# Patient Record
Sex: MALE | Race: WHITE | NOT HISPANIC OR LATINO | URBAN - METROPOLITAN AREA
[De-identification: names, ages, dates, MRNs, and addresses within clinical notes are randomized per-mention and may not be internally consistent; named-entity substitution may affect disease eponyms.]

---

## 2018-03-05 ENCOUNTER — INPATIENT (INPATIENT)
Facility: HOSPITAL | Age: 78
LOS: 2 days | Discharge: HOME | End: 2018-03-08
Attending: INTERNAL MEDICINE

## 2018-03-05 VITALS
TEMPERATURE: 97 F | HEART RATE: 83 BPM | DIASTOLIC BLOOD PRESSURE: 59 MMHG | SYSTOLIC BLOOD PRESSURE: 122 MMHG | RESPIRATION RATE: 18 BRPM | HEIGHT: 63 IN | WEIGHT: 147.93 LBS | OXYGEN SATURATION: 98 %

## 2018-03-05 DIAGNOSIS — J18.9 PNEUMONIA, UNSPECIFIED ORGANISM: ICD-10-CM

## 2018-03-05 DIAGNOSIS — C34.90 MALIGNANT NEOPLASM OF UNSPECIFIED PART OF UNSPECIFIED BRONCHUS OR LUNG: ICD-10-CM

## 2018-03-05 DIAGNOSIS — J15.6 PNEUMONIA DUE TO OTHER GRAM-NEGATIVE BACTERIA: ICD-10-CM

## 2018-03-05 DIAGNOSIS — I25.10 ATHEROSCLEROTIC HEART DISEASE OF NATIVE CORONARY ARTERY WITHOUT ANGINA PECTORIS: ICD-10-CM

## 2018-03-05 DIAGNOSIS — J98.19 OTHER PULMONARY COLLAPSE: Chronic | ICD-10-CM

## 2018-03-05 DIAGNOSIS — N18.6 END STAGE RENAL DISEASE: ICD-10-CM

## 2018-03-05 LAB
ALBUMIN SERPL ELPH-MCNC: 3.1 G/DL — SIGNIFICANT CHANGE UP (ref 3–5.5)
ALP SERPL-CCNC: 108 U/L — SIGNIFICANT CHANGE UP (ref 30–115)
ALT FLD-CCNC: 10 U/L — SIGNIFICANT CHANGE UP (ref 0–41)
ANION GAP SERPL CALC-SCNC: 10 MMOL/L — SIGNIFICANT CHANGE UP (ref 7–14)
APTT BLD: 30 SEC — SIGNIFICANT CHANGE UP (ref 27–39.2)
AST SERPL-CCNC: 13 U/L — SIGNIFICANT CHANGE UP (ref 0–41)
BASOPHILS # BLD AUTO: 0.03 K/UL — SIGNIFICANT CHANGE UP (ref 0–0.2)
BASOPHILS NFR BLD AUTO: 0.5 % — SIGNIFICANT CHANGE UP (ref 0–1)
BILIRUB SERPL-MCNC: 0.8 MG/DL — SIGNIFICANT CHANGE UP (ref 0.2–1.2)
BUN SERPL-MCNC: 54 MG/DL — HIGH (ref 10–20)
CALCIUM SERPL-MCNC: 9.2 MG/DL — SIGNIFICANT CHANGE UP (ref 8.5–10.1)
CHLORIDE SERPL-SCNC: 100 MMOL/L — SIGNIFICANT CHANGE UP (ref 98–110)
CK MB BLD-MCNC: 12 % — HIGH (ref 0–4)
CK MB CFR SERPL CALC: 3.1 NG/ML — SIGNIFICANT CHANGE UP (ref 0.6–6.3)
CK SERPL-CCNC: 26 U/L — SIGNIFICANT CHANGE UP (ref 0–225)
CO2 SERPL-SCNC: 31 MMOL/L — SIGNIFICANT CHANGE UP (ref 17–32)
CREAT SERPL-MCNC: 6.2 MG/DL — CRITICAL HIGH (ref 0.7–1.5)
EOSINOPHIL # BLD AUTO: 0.13 K/UL — SIGNIFICANT CHANGE UP (ref 0–0.7)
EOSINOPHIL NFR BLD AUTO: 2.1 % — SIGNIFICANT CHANGE UP (ref 0–8)
GLUCOSE SERPL-MCNC: 128 MG/DL — HIGH (ref 70–110)
HCT VFR BLD CALC: 36.5 % — LOW (ref 42–52)
HGB BLD-MCNC: 11.6 G/DL — LOW (ref 14–18)
IMM GRANULOCYTES NFR BLD AUTO: 0.5 % — HIGH (ref 0.1–0.3)
INR BLD: 1.21 RATIO — SIGNIFICANT CHANGE UP (ref 0.65–1.3)
LYMPHOCYTES # BLD AUTO: 0.84 K/UL — LOW (ref 1.2–3.4)
LYMPHOCYTES # BLD AUTO: 13.3 % — LOW (ref 20.5–51.1)
MCHC RBC-ENTMCNC: 31.6 PG — HIGH (ref 27–31)
MCHC RBC-ENTMCNC: 31.8 G/DL — LOW (ref 32–37)
MCV RBC AUTO: 99.5 FL — HIGH (ref 80–94)
MONOCYTES # BLD AUTO: 0.89 K/UL — HIGH (ref 0.1–0.6)
MONOCYTES NFR BLD AUTO: 14.1 % — HIGH (ref 1.7–9.3)
NEUTROPHILS # BLD AUTO: 4.4 K/UL — SIGNIFICANT CHANGE UP (ref 1.4–6.5)
NEUTROPHILS NFR BLD AUTO: 69.5 % — SIGNIFICANT CHANGE UP (ref 42.2–75.2)
NRBC # BLD: 0 /100 WBCS — SIGNIFICANT CHANGE UP (ref 0–0)
PLATELET # BLD AUTO: 165 K/UL — SIGNIFICANT CHANGE UP (ref 130–400)
POTASSIUM SERPL-MCNC: 4 MMOL/L — SIGNIFICANT CHANGE UP (ref 3.5–5)
POTASSIUM SERPL-SCNC: 4 MMOL/L — SIGNIFICANT CHANGE UP (ref 3.5–5)
PROT SERPL-MCNC: 6.2 G/DL — SIGNIFICANT CHANGE UP (ref 6–8)
PROTHROM AB SERPL-ACNC: 13.2 SEC — HIGH (ref 9.95–12.87)
RBC # BLD: 3.67 M/UL — LOW (ref 4.7–6.1)
RBC # FLD: 15.2 % — HIGH (ref 11.5–14.5)
SODIUM SERPL-SCNC: 141 MMOL/L — SIGNIFICANT CHANGE UP (ref 135–146)
TROPONIN I SERPL-MCNC: 0.03 NG/ML — SIGNIFICANT CHANGE UP (ref 0–0.05)
WBC # BLD: 6.32 K/UL — SIGNIFICANT CHANGE UP (ref 4.8–10.8)
WBC # FLD AUTO: 6.32 K/UL — SIGNIFICANT CHANGE UP (ref 4.8–10.8)

## 2018-03-05 RX ORDER — IPRATROPIUM/ALBUTEROL SULFATE 18-103MCG
3 AEROSOL WITH ADAPTER (GRAM) INHALATION ONCE
Qty: 0 | Refills: 0 | Status: COMPLETED | OUTPATIENT
Start: 2018-03-05 | End: 2018-03-05

## 2018-03-05 RX ORDER — CEFEPIME 1 G/1
1000 INJECTION, POWDER, FOR SOLUTION INTRAMUSCULAR; INTRAVENOUS EVERY 12 HOURS
Qty: 0 | Refills: 0 | Status: DISCONTINUED | OUTPATIENT
Start: 2018-03-05 | End: 2018-03-06

## 2018-03-05 RX ORDER — ASPIRIN/CALCIUM CARB/MAGNESIUM 324 MG
81 TABLET ORAL DAILY
Qty: 0 | Refills: 0 | Status: DISCONTINUED | OUTPATIENT
Start: 2018-03-05 | End: 2018-03-08

## 2018-03-05 RX ORDER — CLOPIDOGREL BISULFATE 75 MG/1
75 TABLET, FILM COATED ORAL DAILY
Qty: 0 | Refills: 0 | Status: DISCONTINUED | OUTPATIENT
Start: 2018-03-05 | End: 2018-03-08

## 2018-03-05 RX ORDER — CEFTRIAXONE 500 MG/1
1 INJECTION, POWDER, FOR SOLUTION INTRAMUSCULAR; INTRAVENOUS EVERY 24 HOURS
Qty: 0 | Refills: 0 | Status: DISCONTINUED | OUTPATIENT
Start: 2018-03-05 | End: 2018-03-06

## 2018-03-05 RX ORDER — AZITHROMYCIN 500 MG/1
500 TABLET, FILM COATED ORAL EVERY 24 HOURS
Qty: 0 | Refills: 0 | Status: DISCONTINUED | OUTPATIENT
Start: 2018-03-05 | End: 2018-03-08

## 2018-03-05 RX ORDER — ALBUTEROL 90 UG/1
2.5 AEROSOL, METERED ORAL
Qty: 0 | Refills: 0 | Status: DISCONTINUED | OUTPATIENT
Start: 2018-03-05 | End: 2018-03-08

## 2018-03-05 RX ORDER — HEPARIN SODIUM 5000 [USP'U]/ML
5000 INJECTION INTRAVENOUS; SUBCUTANEOUS EVERY 8 HOURS
Qty: 0 | Refills: 0 | Status: DISCONTINUED | OUTPATIENT
Start: 2018-03-05 | End: 2018-03-08

## 2018-03-05 RX ORDER — SODIUM CHLORIDE 9 MG/ML
3 INJECTION INTRAMUSCULAR; INTRAVENOUS; SUBCUTANEOUS ONCE
Qty: 0 | Refills: 0 | Status: COMPLETED | OUTPATIENT
Start: 2018-03-05 | End: 2018-03-05

## 2018-03-05 RX ADMIN — Medication 3 MILLILITER(S): at 16:40

## 2018-03-05 RX ADMIN — Medication 125 MILLIGRAM(S): at 16:40

## 2018-03-05 RX ADMIN — CEFEPIME 100 MILLIGRAM(S): 1 INJECTION, POWDER, FOR SOLUTION INTRAMUSCULAR; INTRAVENOUS at 21:14

## 2018-03-05 RX ADMIN — SODIUM CHLORIDE 3 MILLILITER(S): 9 INJECTION INTRAMUSCULAR; INTRAVENOUS; SUBCUTANEOUS at 16:40

## 2018-03-05 RX ADMIN — Medication 3 MILLILITER(S): at 16:23

## 2018-03-05 RX ADMIN — Medication 3 MILLILITER(S): at 18:08

## 2018-03-05 NOTE — H&P ADULT - PMH
COPD (chronic obstructive pulmonary disease)    Coronary artery disease    End stage renal disease    Lung cancer

## 2018-03-05 NOTE — ED PROVIDER NOTE - PROGRESS NOTE DETAILS
supervising care of this patient pt reports relief of symptoms after neb treatment. discussed results of CXR with patients and discussed benefits of admission. pt agrees with plan for IV ABx.

## 2018-03-05 NOTE — ED PROVIDER NOTE - ATTENDING CONTRIBUTION TO CARE
78 yo m with pmh of ESRD on HD, copd , lung CA, presents with 10 days of cough, congestion.  pt already finished 5 days of prednisone and levaquin.  no fevers, no chills, no nausea, no vomiting, no back pain, no headache.  no other complaints.    awake, alert.  neck supple.  Lungs with mild wheezing on the left.  No distress.  + prolonged expiration.  p:  nebs, steroids, cxr.  possible copd exacerbation.

## 2018-03-05 NOTE — H&P ADULT - NSHPPHYSICALEXAM_GEN_ALL_CORE
Lying in no acute distress  Pupils equal and reactive to light and accommodation  Supple Neck  right lower lobe decreased breath sounds, with rales   + Murmur with regualr rate and rhythm  + bowel sounds nontnender  no cyanosis or edema  awake alert and oriented x 3

## 2018-03-05 NOTE — H&P ADULT - NSHPLABSRESULTS_GEN_ALL_CORE
11.6   6.32  )-----------( 165      ( 05 Mar 2018 14:28 )             36.5   03-05    141  |  100  |  54<H>  ----------------------------<  128<H>  4.0   |  31  |  6.2<HH>    Ca    9.2      05 Mar 2018 14:28    TPro  6.2  /  Alb  3.1  /  TBili  0.8  /  DBili  x   /  AST  13  /  ALT  10  /  AlkPhos  108  03-05      < from: Xray Chest 2 Views PA/Lat (03.05.18 @ 15:57) >    Impression:      Right upper lobe opacity, right apical pleural thickening, right opacity   and fibrosis , right costophrenic angle blunting, mediastinal shift to   the right, new... Stable cardiomegaly        < end of copied text >    EKG SInus 80 bpm interpreted by me

## 2018-03-05 NOTE — ED ADULT TRIAGE NOTE - CHIEF COMPLAINT QUOTE
Pt states "I've been having this wheeze in my chest for 2 weeks.  My doctor put me on Levaquin and Prednisone but I still have the wheeze".

## 2018-03-05 NOTE — ED PROVIDER NOTE - OBJECTIVE STATEMENT
78y/o M, h/o lung CA-in remission, COPD, ESRD-on dialysis (Sun, Tues, Thurs- received dialysis yesterday) presents with wheezing and dry cough for 2 weeks. Pt reports he was prescribed Levaquin and prednisone, which provided minimal relief of his wheezing. minimal relief with daily inhalers. denies fever, CP, palpitations, VALERO, leg swelling, orthopnea.

## 2018-03-05 NOTE — H&P ADULT - HISTORY OF PRESENT ILLNESS
77y male presented with complaints of shortness of breath and wheezing for 2 weeks. No releiving or aggravating factors, no associated symtptoms ,had trial of levaquin x 10 days which did not alleviate the symptoms

## 2018-03-05 NOTE — H&P ADULT - ASSESSMENT
77y with significant pulmonary history presented with shrotness of breath   diagnosis suspected gram neg pneumonia

## 2018-03-06 DIAGNOSIS — I20.8 OTHER FORMS OF ANGINA PECTORIS: ICD-10-CM

## 2018-03-06 RX ORDER — CEFEPIME 1 G/1
1000 INJECTION, POWDER, FOR SOLUTION INTRAMUSCULAR; INTRAVENOUS EVERY 24 HOURS
Qty: 0 | Refills: 0 | Status: DISCONTINUED | OUTPATIENT
Start: 2018-03-06 | End: 2018-03-06

## 2018-03-06 RX ORDER — CEFEPIME 1 G/1
1000 INJECTION, POWDER, FOR SOLUTION INTRAMUSCULAR; INTRAVENOUS DAILY
Qty: 0 | Refills: 0 | Status: DISCONTINUED | OUTPATIENT
Start: 2018-03-06 | End: 2018-03-08

## 2018-03-06 RX ORDER — FUROSEMIDE 40 MG
40 TABLET ORAL ONCE
Qty: 0 | Refills: 0 | Status: COMPLETED | OUTPATIENT
Start: 2018-03-06 | End: 2018-03-06

## 2018-03-06 RX ORDER — CEFEPIME 1 G/1
500 INJECTION, POWDER, FOR SOLUTION INTRAMUSCULAR; INTRAVENOUS ONCE
Qty: 0 | Refills: 0 | Status: COMPLETED | OUTPATIENT
Start: 2018-03-06 | End: 2018-03-06

## 2018-03-06 RX ORDER — CEFEPIME 1 G/1
INJECTION, POWDER, FOR SOLUTION INTRAMUSCULAR; INTRAVENOUS
Qty: 0 | Refills: 0 | Status: DISCONTINUED | OUTPATIENT
Start: 2018-03-06 | End: 2018-03-06

## 2018-03-06 RX ADMIN — AZITHROMYCIN 255 MILLIGRAM(S): 500 TABLET, FILM COATED ORAL at 05:29

## 2018-03-06 RX ADMIN — CEFEPIME 100 MILLIGRAM(S): 1 INJECTION, POWDER, FOR SOLUTION INTRAMUSCULAR; INTRAVENOUS at 15:43

## 2018-03-06 RX ADMIN — CLOPIDOGREL BISULFATE 75 MILLIGRAM(S): 75 TABLET, FILM COATED ORAL at 11:09

## 2018-03-06 RX ADMIN — Medication 50 MILLIGRAM(S): at 05:27

## 2018-03-06 RX ADMIN — Medication 81 MILLIGRAM(S): at 11:09

## 2018-03-06 RX ADMIN — CEFTRIAXONE 100 GRAM(S): 500 INJECTION, POWDER, FOR SOLUTION INTRAMUSCULAR; INTRAVENOUS at 05:27

## 2018-03-06 RX ADMIN — Medication 40 MILLIGRAM(S): at 13:13

## 2018-03-06 RX ADMIN — CEFEPIME 100 MILLIGRAM(S): 1 INJECTION, POWDER, FOR SOLUTION INTRAMUSCULAR; INTRAVENOUS at 05:29

## 2018-03-06 NOTE — CONSULT NOTE ADULT - SUBJECTIVE AND OBJECTIVE BOX
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 He is maintained on hemodialysis on TTS at [facility] in [place], [place]. His HD access is a LUE AVF. Last HD treatment was Saturday. Presented with 1 week history of cough, SOB, and VALERO. CXR showed pneumonia. Started on antibiotics.    PAST HISTORY  --------------------------------------------------------------------------------  PAST MEDICAL & SURGICAL HISTORY:  End stage renal disease  Coronary artery disease  Lung cancer  COPD (chronic obstructive pulmonary disease)  Lung collapse    FAMILY HISTORY: negative for kidney disease    PAST SOCIAL HISTORY: Former smoker. No current ETOH, tobacco and illicit drug use.    ALLERGIES & MEDICATIONS  --------------------------------------------------------------------------------  Allergies    No Known Allergies    Standing Inpatient Medications  aspirin  chewable 81 milliGRAM(s) Oral daily  azithromycin  IVPB 500 milliGRAM(s) IV Intermittent every 24 hours  cefepime  IVPB 1000 milliGRAM(s) IV Intermittent every 12 hours  cefTRIAXone   IVPB 1 Gram(s) IV Intermittent every 24 hours  clopidogrel Tablet 75 milliGRAM(s) Oral daily  heparin  Injectable 5000 Unit(s) SubCutaneous every 8 hours  predniSONE   Tablet 50 milliGRAM(s) Oral daily    PRN Inpatient Medications  ALBUTerol   0.5% 2.5 milliGRAM(s) Nebulizer four times a day PRN      REVIEW OF SYSTEMS  --------------------------------------------------------------------------------  Gen: No weakness  Skin: No rashes  Head/Eyes/Ears/Mouth: No headache; No sinus pain/discomfort, sore throat  Respiratory: No dyspnea, cough, wheezing, hemoptysis  CV: No chest pain, PND, orthopnea  GI: No abdominal pain, diarrhea, constipation, nausea, vomiting, melena, hematochezia  : No increased frequency, dysuria, hematuria, nocturia    All other systems were reviewed and are negative, except as noted.    VITALS/PHYSICAL EXAM  --------------------------------------------------------------------------------  T(C): 35.7 (03-06-18 @ 05:28), Max: 36 (03-05-18 @ 13:22)  HR: 95 (03-06-18 @ 05:28) (73 - 95)  BP: 140/79 (03-06-18 @ 05:28) (122/59 - 151/67)  RR: 16 (03-06-18 @ 05:28) (16 - 18)  SpO2: 98% (03-06-18 @ 07:43) (97% - 98%)  Height (cm): 160.02 (03-05-18 @ 19:32)  Weight (kg): 67.1 (03-05-18 @ 13:22)  BMI (kg/m2): 26.2 (03-05-18 @ 19:32)  BSA (m2): 1.7 (03-05-18 @ 19:32)    Physical Exam:  	Gen: NAD, well-appearing  	HEENT: PERRL, supple neck, clear oropharynx  	Pulm: R wheeze/ronchi  	CV: RRR, S1S2  	Back: No CVA tenderness; no sacral edema  	Abd: +BS, soft, nontender/nondistended  	: No suprapubic tenderness  	LE: Warm, trace edema  	Neuro: AAO x3  	Psych: Normal affect and mood  	Skin: Warm, without rashes  	Vascular access: LUE AVF with good thrill/bruit    LABS/STUDIES  --------------------------------------------------------------------------------              11.6   6.32  >-----------<  165      [03-05-18 @ 14:28]              36.5     141  |  100  |  54  ----------------------------<  128      [03-05-18 @ 14:28]  4.0   |  31  |  6.2        Ca     9.2     [03-05-18 @ 14:28]    TPro  6.2  /  Alb  3.1  /  TBili  0.8  /  DBili  x   /  AST  13  /  ALT  10  /  AlkPhos  108  [03-05-18 @ 14:28]    PT/INR: PT 13.20, INR 1.21       [03-05-18 @ 14:28]  PTT: 30.0       [03-05-18 @ 14:28]    Troponin 0.03      [03-05-18 @ 14:28]  CK 26      [03-05-18 @ 14:28]

## 2018-03-06 NOTE — CONSULT NOTE ADULT - SUBJECTIVE AND OBJECTIVE BOX
77y male presented with complaints of shortness of breath and wheezing for 2 weeks. No releiving or aggravating factors, no associated symtptoms ,had trial of levaquin x 10 days which did not alleviate the symptoms (05 Mar 2018 18:52)    PAST MEDICAL & SURGICAL HISTORY:  End stage renal disease  Coronary artery disease  Lung cancer  COPD (chronic obstructive pulmonary disease)  Lung collapse    SOCIAL HISTORY:  Smoking: ex smoker    Allergies  No Known Allergies    Home Medications:  aspirin 81 mg oral tablet, chewable: 1 tab(s) orally once a day (05 Mar 2018 18:55)  Plavix 75 mg oral tablet: 1 tab(s) orally once a day (05 Mar 2018 18:55)  pravastatin 40 mg oral tablet: 1 tab(s) orally once a day (05 Mar 2018 18:55)      REVIEW OF SYSTEMS:  Constitutional: No fevers or chills. No weight loss. No fatigue or generalized malaise.  Eyes: No itching or discharge from the eyes  ENT: No ear pain. No ear discharge. No nasal congestion. No post nasal drip. No epistaxis. No throat pain. No sore throat. No difficulty swallowing.   CV: +chest pain. No palpitations. No lightheadedness or dizziness.   Resp: +dyspnea at rest. +dyspnea on exertion. No orthopnea. +wheezing. +cough. No stridor. No sputum production. No chest pain with respiration.  GI: No nausea. No vomiting. No diarrhea.  MSK: No joint pain or pain in any extremities  Integumentary: No skin lesions. No pedal edema.  Neurological: No gross motor weakness. No sensory changes.    OBJECTIVE:  ICU Vital Signs Last 24 Hrs  T(C): 35.7 (06 Mar 2018 05:28), Max: 36 (05 Mar 2018 13:22)  T(F): 96.3 (06 Mar 2018 05:28), Max: 96.8 (05 Mar 2018 13:22)  HR: 95 (06 Mar 2018 05:28) (73 - 95)  BP: 140/79 (06 Mar 2018 05:28) (122/59 - 151/67)  BP(mean): --  ABP: --  ABP(mean): --  RR: 16 (06 Mar 2018 05:28) (16 - 18)  SpO2: 98% (06 Mar 2018 07:43) (97% - 98%)      PHYSICAL EXAM:  · CONSTITUTIONAL: - - -  · Appearance: ILL APPEARING  · Development: well developed  · Distress: mild  · Manner: appropriate for situation  · Mentation: awake, alert, oriented to person, place, time/situation  · Mood: appropriate  · Nourishment: well  · ENMT: Airway patent, Nasal mucosa clear. Mouth with normal mucosa. Throat has no vesicles, no oropharyngeal exudates and uvula is midline.  · EYES: Clear bilaterally, pupils equal, round and reactive to light.  · CARDIAC: - - -  · Cardiac Rhythm: REGULAR  · Cardiac Rate: TACHYCARDIC  · Heart Sounds: S1-S2  · Murmur: no murmur  · Pedal Edema: PITTING  · Capillary Refill: less than or equal to 2 seconds  · Jugular Vein Distention: non-distended bilaterally  · Cardiac Pulses: normal bilaterally  · RESPIRATORY: - - -  · Respiratory Distress: no  · Breath Sounds: wheeze  · GASTROINTESTINAL: Abdomen soft, non-tender, no guarding.  · MUSCULOSKELETAL: Spine appears normal, range of motion is not limited, no muscle or joint tenderness  · NEUROLOGICAL: Alert and oriented, no focal deficits, no motor or sensory deficits.  · SKIN: Skin normal color for race, warm, dry and intact. No evidence of rash.      HOSPITAL MEDICATIONS:  MEDICATIONS  (STANDING):  aspirin  chewable 81 milliGRAM(s) Oral daily  azithromycin  IVPB 500 milliGRAM(s) IV Intermittent every 24 hours  cefepime  IVPB 1000 milliGRAM(s) IV Intermittent every 12 hours  cefTRIAXone   IVPB 1 Gram(s) IV Intermittent every 24 hours  clopidogrel Tablet 75 milliGRAM(s) Oral daily  heparin  Injectable 5000 Unit(s) SubCutaneous every 8 hours  levoFLOXacin IVPB 750 milliGRAM(s) IV Intermittent every 24 hours  predniSONE   Tablet 50 milliGRAM(s) Oral daily    MEDICATIONS  (PRN):  ALBUTerol   0.5% 2.5 milliGRAM(s) Nebulizer four times a day PRN Shortness of Breath      LABS:                        11.6   6.32  )-----------( 165      ( 05 Mar 2018 14:28 )             36.5     03-05    141  |  100  |  54<H>  ----------------------------<  128<H>  4.0   |  31  |  6.2<HH>    Ca    9.2      05 Mar 2018 14:28    TPro  6.2  /  Alb  3.1  /  TBili  0.8  /  DBili  x   /  AST  13  /  ALT  10  /  AlkPhos  108  03-05    PT/INR - ( 05 Mar 2018 14:28 )   PT: 13.20 sec;   INR: 1.21 ratio      PTT - ( 05 Mar 2018 14:28 )  PTT:30.0 sec      RADIOLOGY:  Right upper lobe opacity, right apical pleural thickening, right opacity   and fibrosis , right costophrenic angle blunting, mediastinal shift to   the right, new... Stable cardiomegaly      ASSESMENT:  Acute chronic COPD with exacerbation  Lung Cancer  atelectasis RUL and RLL possibly due to mediastinal adenopathy    PLAN:    Check O2 sat on NC, record, continue O2 as necessary to maintain sats > 90%  Continue IV solumedrol as ordered with bronchodilator treatments ATC and PRN  will need CT chest non contrast  OOB to chair  GI and DVT prophylaxis  pulmonary toilet  Monitor clinically, convert to oral prednisone as clinical improvement allows 77y male presented with complaints of shortness of breath and wheezing for 2 weeks. No releiving or aggravating factors, no associated symtptoms ,had trial of levaquin x 10 days which did not alleviate the symptoms (05 Mar 2018 18:52)    PAST MEDICAL & SURGICAL HISTORY:  End stage renal disease  Coronary artery disease  Lung cancer  COPD (chronic obstructive pulmonary disease)  Lung collapse    SOCIAL HISTORY:  Smoking: ex smoker    Allergies  No Known Allergies    Home Medications:  aspirin 81 mg oral tablet, chewable: 1 tab(s) orally once a day (05 Mar 2018 18:55)  Plavix 75 mg oral tablet: 1 tab(s) orally once a day (05 Mar 2018 18:55)  pravastatin 40 mg oral tablet: 1 tab(s) orally once a day (05 Mar 2018 18:55)      REVIEW OF SYSTEMS:  Constitutional: No fevers or chills. No weight loss. No fatigue or generalized malaise.  Eyes: No itching or discharge from the eyes  ENT: No ear pain. No ear discharge. No nasal congestion. No post nasal drip. No epistaxis. No throat pain. No sore throat. No difficulty swallowing.   CV: +chest pain. No palpitations. No lightheadedness or dizziness.   Resp: +dyspnea at rest. +dyspnea on exertion. No orthopnea. +wheezing. +cough. No stridor. No sputum production. No chest pain with respiration.  GI: No nausea. No vomiting. No diarrhea.  MSK: No joint pain or pain in any extremities  Integumentary: No skin lesions. No pedal edema.  Neurological: No gross motor weakness. No sensory changes.    OBJECTIVE:  ICU Vital Signs Last 24 Hrs  T(C): 35.7 (06 Mar 2018 05:28), Max: 36 (05 Mar 2018 13:22)  T(F): 96.3 (06 Mar 2018 05:28), Max: 96.8 (05 Mar 2018 13:22)  HR: 95 (06 Mar 2018 05:28) (73 - 95)  BP: 140/79 (06 Mar 2018 05:28) (122/59 - 151/67)  BP(mean): --  ABP: --  ABP(mean): --  RR: 16 (06 Mar 2018 05:28) (16 - 18)  SpO2: 98% (06 Mar 2018 07:43) (97% - 98%)      PHYSICAL EXAM:  · CONSTITUTIONAL: - - -  · Appearance: NOT  ILL APPEARING  · Development: well developed  · Distress: no  · Manner: appropriate for situation  · Mentation: awake, alert, oriented to person, place, time/situation  · Mood: appropriate  · Nourishment: well  · ENMT: Airway patent, Nasal mucosa clear. Mouth with normal mucosa. Throat has no vesicles, no oropharyngeal exudates and uvula is midline.  · EYES: Clear bilaterally, pupils equal, round and reactive to light.  · CARDIAC: - - -  · Cardiac Rhythm: REGULAR  · Cardiac Rate: normal  · Heart Sounds: S1-S2  · Murmur: no murmur  · Pedal Edema: PITTING  · Capillary Refill: less than or equal to 2 seconds  · Jugular Vein Distention: non-distended bilaterally  · Cardiac Pulses: normal bilaterally  · RESPIRATORY: - - -  · Respiratory Distress: no  · Breath Sounds: wheeze on R only  · GASTROINTESTINAL: Abdomen soft, non-tender, no guarding.  · MUSCULOSKELETAL: Spine appears normal, range of motion is not limited, no muscle or joint tenderness  · NEUROLOGICAL: Alert and oriented, no focal deficits, no motor or sensory deficits.  · SKIN: Skin normal color for race, warm, dry and intact. No evidence of rash.      HOSPITAL MEDICATIONS:  MEDICATIONS  (STANDING):  aspirin  chewable 81 milliGRAM(s) Oral daily  azithromycin  IVPB 500 milliGRAM(s) IV Intermittent every 24 hours  cefepime  IVPB 1000 milliGRAM(s) IV Intermittent every 12 hours  cefTRIAXone   IVPB 1 Gram(s) IV Intermittent every 24 hours  clopidogrel Tablet 75 milliGRAM(s) Oral daily  heparin  Injectable 5000 Unit(s) SubCutaneous every 8 hours  levoFLOXacin IVPB 750 milliGRAM(s) IV Intermittent every 24 hours  predniSONE   Tablet 50 milliGRAM(s) Oral daily    MEDICATIONS  (PRN):  ALBUTerol   0.5% 2.5 milliGRAM(s) Nebulizer four times a day PRN Shortness of Breath      LABS:                        11.6   6.32  )-----------( 165      ( 05 Mar 2018 14:28 )             36.5     03-05    141  |  100  |  54<H>  ----------------------------<  128<H>  4.0   |  31  |  6.2<HH>    Ca    9.2      05 Mar 2018 14:28    TPro  6.2  /  Alb  3.1  /  TBili  0.8  /  DBili  x   /  AST  13  /  ALT  10  /  AlkPhos  108  03-05    PT/INR - ( 05 Mar 2018 14:28 )   PT: 13.20 sec;   INR: 1.21 ratio      PTT - ( 05 Mar 2018 14:28 )  PTT:30.0 sec      RADIOLOGY:  Right upper lobe opacity, right apical pleural thickening, right opacity   and fibrosis , right costophrenic angle blunting, mediastinal shift to   the right, new... Stable cardiomegaly      ASSESMENT:  Acute chronic COPD with exacerbation  Lung Cancer  -new atelectasis RUL and RLL possibly due to mediastinal adenopathy from return of the cancer?. I do not think he has a pneumonia.    PLAN:    -Check O2 sat on NC, record, continue O2 as necessary to maintain sats > 90%  -taper IV solumedrol as ordered with bronchodilator treatments ATC and PRN  -will need CT chest non contrast. The patient has appointment with his oncologist in [place] next week. He says he is scheduled for a CT at that time.  -ambulate  -GI and DVT prophylaxis  -pulmonary toilet  -Monitor clinically, convert to oral prednisone as clinical improvement allows  The patient can be discharged from a pulmonary standpoint. He is currently waiting for a run of RRT prior to D/C however

## 2018-03-06 NOTE — DISCHARGE NOTE ADULT - CARE PLAN
Principal Discharge DX:	Pneumonia  Goal:	improved from presentation  Assessment and plan of treatment:	po antibioitics and prednsione taper  Secondary Diagnosis:	End stage renal disease  Goal:	on hd  Assessment and plan of treatment:	hd tts as regualrly scheduled Principal Discharge DX:	Pneumonia  Goal:	improved from presentation  Assessment and plan of treatment:	po antibioitics and prednsione taper  Secondary Diagnosis:	End stage renal disease  Goal:	on hd  Assessment and plan of treatment:	hd tts as regularly scheduled

## 2018-03-06 NOTE — PROGRESS NOTE ADULT - PROBLEM SELECTOR PLAN 1
rocephin azithromycin, nebulizers, prednisone, blood culture,     appreciate pulmonary consult  will order ct scan

## 2018-03-06 NOTE — DISCHARGE NOTE ADULT - HOSPITAL COURSE
77y male presented with complaints of shortness of breath and wheezing for 2 weeks. No releiving or aggravating factors, no associated symtptoms ,had trial of levaquin x 10 days which did not alleviate the symptoms	  < from: Xray Chest 2 Views PA/Lat (03.05.18 @ 15:57) >    Right upper lobe opacity, right apical pleural thickening, right opacity   and fibrosis , right costophrenic angle blunting, mediastinal shift to   the right, new... Stable cardiomegaly    < end of copied text >

## 2018-03-06 NOTE — CONSULT NOTE ADULT - ASSESSMENT
1. ESRD on HD TTS. Will arrange for HD tomorrow, no urgent indication for HD today. HD prescription: 3 hours, opti 160 dialyzer, 2K bath, 1L UF.  2. Pneumonia. Stop ceftriaxone. Decrease cefepime to 1gm IV daily. Continue azithromycin.  3. Anemia. Adequate Hgb. No need for SHELLIE.

## 2018-03-06 NOTE — PROGRESS NOTE ADULT - SUBJECTIVE AND OBJECTIVE BOX
JASMINA CEBALLOS  77y  [facility]-S [address] [place] [address]      Patient is a 77y old  Male who presents with a chief complaint of Shortness of breath (05 Mar 2018 18:52)      INTERVAL HPI/OVERNIGHT EVENTS:  no events overnight , walking in no distress      REVIEW OF SYSTEMS:  CONSTITUTIONAL: No fever, weight loss, or fatigue  EYES: No eye pain, visual disturbances, or discharge  ENMT:  No difficulty hearing, tinnitus, vertigo; No sinus or throat pain  NECK: No pain or stiffness  BREASTS: No pain, masses, or nipple discharge  RESPIRATORY: No cough, wheezing, chills or hemoptysis; No shortness of breath  CARDIOVASCULAR: No chest pain, palpitations, dizziness, or leg swelling  GASTROINTESTINAL: No abdominal or epigastric pain. No nausea, vomiting, or hematemesis; No diarrhea or constipation. No melena or hematochezia.  GENITOURINARY: No dysuria, frequency, hematuria, or incontinence  NEUROLOGICAL: No headaches, memory loss, loss of strength, numbness, or tremors  SKIN: No itching, burning, rashes, or lesions   LYMPH NODES: No enlarged glands  ENDOCRINE: No heat or cold intolerance; No hair loss  MUSCULOSKELETAL: No joint pain or swelling; No muscle, back, or extremity pain  PSYCHIATRIC: No depression, anxiety, mood swings, or difficulty sleeping  HEME/LYMPH: No easy bruising, or bleeding gums  ALLERY AND IMMUNOLOGIC: No hives or eczema  FAMILY HISTORY:    T(C): 35.7 (03-06-18 @ 05:28), Max: 36 (03-05-18 @ 13:22)  HR: 95 (03-06-18 @ 05:28) (73 - 95)  BP: 140/79 (03-06-18 @ 05:28) (122/59 - 151/67)  RR: 16 (03-06-18 @ 05:28) (16 - 18)  SpO2: 98% (03-06-18 @ 07:43) (97% - 98%)  Wt(kg): --Vital Signs Last 24 Hrs  T(C): 35.7 (06 Mar 2018 05:28), Max: 36 (05 Mar 2018 13:22)  T(F): 96.3 (06 Mar 2018 05:28), Max: 96.8 (05 Mar 2018 13:22)  HR: 95 (06 Mar 2018 05:28) (73 - 95)  BP: 140/79 (06 Mar 2018 05:28) (122/59 - 151/67)  BP(mean): --  RR: 16 (06 Mar 2018 05:28) (16 - 18)  SpO2: 98% (06 Mar 2018 07:43) (97% - 98%)    PHYSICAL EXAM:  GENERAL: NAD, well-groomed, well-developed  HEAD:  Atraumatic, Normocephalic  EYES: EOMI, PERRLA, conjunctiva and sclera clear  ENMT: No tonsillar erythema, exudates, or enlargement; Moist mucous membranes, Good dentition, No lesions  NECK: Supple, No JVD, Normal thyroid  NERVOUS SYSTEM:  Alert & Oriented X3, Good concentration; Motor Strength 5/5 B/L upper and lower extremities; DTRs 2+ intact and symmetric  CHEST/LUNG: Clear to auscultation bilaterally  HEART: Regular rate and rhythm; No murmurs, rubs, or gallops  ABDOMEN: Soft, Nontender, Nondistended; Bowel sounds present  EXTREMITIES:  2+ Peripheral Pulses, No clubbing, cyanosis, or edema  LYMPH: No lymphadenopathy noted  SKIN: No rashes or lesions    Consultant(s) Notes Reviewed:  [x ] YES  [ ] NO  Care Discussed with Consultants/Other Providers [ x] YES  [ ] NO    LABS:                            11.6   6.32  )-----------( 165      ( 05 Mar 2018 14:28 )             36.5   03-05    141  |  100  |  54<H>  ----------------------------<  128<H>  4.0   |  31  |  6.2<HH>    Ca    9.2      05 Mar 2018 14:28    TPro  6.2  /  Alb  3.1  /  TBili  0.8  /  DBili  x   /  AST  13  /  ALT  10  /  AlkPhos  108  03-05            ALBUTerol   0.5% 2.5 milliGRAM(s) Nebulizer four times a day PRN  aspirin  chewable 81 milliGRAM(s) Oral daily  azithromycin  IVPB 500 milliGRAM(s) IV Intermittent every 24 hours  cefepime  IVPB 1000 milliGRAM(s) IV Intermittent every 12 hours  cefTRIAXone   IVPB 1 Gram(s) IV Intermittent every 24 hours  clopidogrel Tablet 75 milliGRAM(s) Oral daily  heparin  Injectable 5000 Unit(s) SubCutaneous every 8 hours  predniSONE   Tablet 50 milliGRAM(s) Oral daily      HEALTH ISSUES - PROBLEM Dx:  End stage renal disease: End stage renal disease  Coronary artery disease: Coronary artery disease  Lung cancer: Lung cancer  Pneumonia: Pneumonia          Case Discussed with House Staff   45 minutes spent on total encounter; more than 50% of the visit was spent counseling and/or coordinating care by the attending physician. JASMINA CEBALLOS  77y  [facility]-S [address] [place] [address] 1      Patient is a 77y old  Male who presents with a chief complaint of Shortness of breath (05 Mar 2018 18:52)      INTERVAL HPI/OVERNIGHT EVENTS:  no events overnight , walking in no distress      REVIEW OF SYSTEMS:  CONSTITUTIONAL: No fever, weight loss, or fatigue  EYES: No eye pain, visual disturbances, or discharge  ENMT:  No difficulty hearing, tinnitus, vertigo; No sinus or throat pain  NECK: No pain or stiffness  BREASTS: No pain, masses, or nipple discharge  RESPIRATORY: No cough, wheezing, chills or hemoptysis; No shortness of breath  CARDIOVASCULAR: + Chest Pain midsternal  GASTROINTESTINAL: No abdominal or epigastric pain. No nausea, vomiting, or hematemesis; No diarrhea or constipation. No melena or hematochezia.  GENITOURINARY: No dysuria, frequency, hematuria, or incontinence  NEUROLOGICAL: No headaches, memory loss, loss of strength, numbness, or tremors  SKIN: No itching, burning, rashes, or lesions   LYMPH NODES: No enlarged glands  ENDOCRINE: No heat or cold intolerance; No hair loss  MUSCULOSKELETAL: No joint pain or swelling; No muscle, back, or extremity pain  PSYCHIATRIC: No depression, anxiety, mood swings, or difficulty sleeping  HEME/LYMPH: No easy bruising, or bleeding gums  ALLERY AND IMMUNOLOGIC: No hives or eczema  FAMILY HISTORY:    T(C): 35.7 (03-06-18 @ 05:28), Max: 36 (03-05-18 @ 13:22)  HR: 95 (03-06-18 @ 05:28) (73 - 95)  BP: 140/79 (03-06-18 @ 05:28) (122/59 - 151/67)  RR: 16 (03-06-18 @ 05:28) (16 - 18)  SpO2: 98% (03-06-18 @ 07:43) (97% - 98%)  Wt(kg): --Vital Signs Last 24 Hrs  T(C): 35.7 (06 Mar 2018 05:28), Max: 36 (05 Mar 2018 13:22)  T(F): 96.3 (06 Mar 2018 05:28), Max: 96.8 (05 Mar 2018 13:22)  HR: 95 (06 Mar 2018 05:28) (73 - 95)  BP: 140/79 (06 Mar 2018 05:28) (122/59 - 151/67)  BP(mean): --  RR: 16 (06 Mar 2018 05:28) (16 - 18)  SpO2: 98% (06 Mar 2018 07:43) (97% - 98%)    PHYSICAL EXAM:  GENERAL: NAD, well-groomed, well-developed  HEAD:  Atraumatic, Normocephalic  EYES: EOMI, PERRLA, conjunctiva and sclera clear  ENMT: No tonsillar erythema, exudates, or enlargement; Moist mucous membranes, Good dentition, No lesions  NECK: Supple, No JVD, Normal thyroid  NERVOUS SYSTEM:  Alert & Oriented X3, Good concentration; Motor Strength 5/5 B/L upper and lower extremities; DTRs 2+ intact and symmetric  CHEST/LUNG: Scant wheezes  HEART: Regular rate and rhythm; No murmurs, rubs, or gallops  ABDOMEN: Soft, Nontender, Nondistended; Bowel sounds present  EXTREMITIES:  2+ Peripheral Pulses, No clubbing, cyanosis, or edema  LYMPH: No lymphadenopathy noted  SKIN: No rashes or lesions    Consultant(s) Notes Reviewed:  [x ] YES  [ ] NO  Care Discussed with Consultants/Other Providers [ x] YES  [ ] NO    LABS:                            11.6   6.32  )-----------( 165      ( 05 Mar 2018 14:28 )             36.5   03-05    141  |  100  |  54<H>  ----------------------------<  128<H>  4.0   |  31  |  6.2<HH>    Ca    9.2      05 Mar 2018 14:28    TPro  6.2  /  Alb  3.1  /  TBili  0.8  /  DBili  x   /  AST  13  /  ALT  10  /  AlkPhos  108  03-05            ALBUTerol   0.5% 2.5 milliGRAM(s) Nebulizer four times a day PRN  aspirin  chewable 81 milliGRAM(s) Oral daily  azithromycin  IVPB 500 milliGRAM(s) IV Intermittent every 24 hours  cefepime  IVPB 1000 milliGRAM(s) IV Intermittent every 12 hours  cefTRIAXone   IVPB 1 Gram(s) IV Intermittent every 24 hours  clopidogrel Tablet 75 milliGRAM(s) Oral daily  heparin  Injectable 5000 Unit(s) SubCutaneous every 8 hours  predniSONE   Tablet 50 milliGRAM(s) Oral daily      HEALTH ISSUES - PROBLEM Dx:  End stage renal disease: End stage renal disease  Coronary artery disease: Coronary artery disease  Lung cancer: Lung cancer  Pneumonia: Pneumonia          Case Discussed with House Staff   45 minutes spent on total encounter; more than 50% of the visit was spent counseling and/or coordinating care by the attending physician.

## 2018-03-06 NOTE — DISCHARGE NOTE ADULT - PATIENT PORTAL LINK FT
You can access the [facility], offered by [facility], by registering with the following website: [email or web address]

## 2018-03-06 NOTE — DISCHARGE NOTE ADULT - PLAN OF CARE
improved from presentation po antibioitics and prednsione taper on hd hd tts as regualrly scheduled hd tts as regularly scheduled

## 2018-03-06 NOTE — DISCHARGE NOTE ADULT - MEDICATION SUMMARY - MEDICATIONS TO TAKE
I will START or STAY ON the medications listed below when I get home from the hospital:    Deltasone 20 mg oral tablet  -- 2 tab(s) by mouth once a day   take 40 mg/day for 30 days, then 20 mg/day for 3 days  -- It is very important that you take or use this exactly as directed.  Do not skip doses or discontinue unless directed by your doctor.  Obtain medical advice before taking any non-prescription drugs as some may affect the action of this medication.  Take with food or milk.    -- Indication: For COPD (chronic obstructive pulmonary disease)    aspirin 81 mg oral tablet, chewable  -- 1 tab(s) by mouth once a day  -- Indication: For Coronary artery disease    pravastatin 40 mg oral tablet  -- 1 tab(s) by mouth once a day  -- Indication: For Coronary artery disease    Plavix 75 mg oral tablet  -- 1 tab(s) by mouth once a day  -- Indication: For Coronary artery disease

## 2018-03-07 RX ORDER — ATORVASTATIN CALCIUM 80 MG/1
10 TABLET, FILM COATED ORAL AT BEDTIME
Qty: 0 | Refills: 0 | Status: DISCONTINUED | OUTPATIENT
Start: 2018-03-07 | End: 2018-03-08

## 2018-03-07 RX ADMIN — Medication 50 MILLIGRAM(S): at 05:35

## 2018-03-07 RX ADMIN — CLOPIDOGREL BISULFATE 75 MILLIGRAM(S): 75 TABLET, FILM COATED ORAL at 14:13

## 2018-03-07 RX ADMIN — ATORVASTATIN CALCIUM 10 MILLIGRAM(S): 80 TABLET, FILM COATED ORAL at 21:12

## 2018-03-07 RX ADMIN — AZITHROMYCIN 255 MILLIGRAM(S): 500 TABLET, FILM COATED ORAL at 05:35

## 2018-03-07 RX ADMIN — Medication 81 MILLIGRAM(S): at 14:13

## 2018-03-07 RX ADMIN — CEFEPIME 1000 MILLIGRAM(S): 1 INJECTION, POWDER, FOR SOLUTION INTRAMUSCULAR; INTRAVENOUS at 13:57

## 2018-03-07 NOTE — PROGRESS NOTE ADULT - PROBLEM SELECTOR PLAN 5
refusing acs workup,  discussed risks and benefits including death  pt currently comfortable  refusing to go home today because of the snow
refusing acs workup,  discussed risks and benefits including death,

## 2018-03-07 NOTE — PROGRESS NOTE ADULT - SUBJECTIVE AND OBJECTIVE BOX
JASMINA CEBALLOS  77y  Male      Patient is a 77y old  Male who presents with a chief complaint of Shortness of breath (06 Mar 2018 17:49)      INTERVAL HPI/OVERNIGHT EVENTS:  none  had HD today, returned to floor just before 1 pm      T(C): 36.1 (03-07-18 @ 05:45), Max: 36.2 (03-06-18 @ 13:12)  HR: 69 (03-07-18 @ 12:05) (69 - 96)  BP: 112/63 (03-07-18 @ 12:05) (106/54 - 145/73)  RR: 16 (03-07-18 @ 09:05) (16 - 16)  SpO2: 96% (03-07-18 @ 08:15) (96% - 98%)  Wt(kg): --  Vital Signs Last 24 Hrs  T(C): 36.1 (07 Mar 2018 05:45), Max: 36.2 (06 Mar 2018 13:12)  T(F): 97 (07 Mar 2018 05:45), Max: 97.1 (06 Mar 2018 13:12)  HR: 69 (07 Mar 2018 12:05) (69 - 96)  BP: 112/63 (07 Mar 2018 12:05) (106/54 - 145/73)  BP(mean): 66 (06 Mar 2018 13:12) (66 - 66)  RR: 16 (07 Mar 2018 09:05) (16 - 16)  SpO2: 96% (07 Mar 2018 08:15) (96% - 98%)    PHYSICAL EXAM:  GENERAL: NAD   HEAD:  Atraumatic, Normocephalic  CHEST/LUNG: Clear to auscultation   HEART: S1/S2  ABDOMEN: Soft, Nontender   EXTREMITIES:  no edema    LABS:                          11.6   6.32  )-----------( 165      ( 05 Mar 2018 14:28 )             36.5     03-05    141  |  100  |  54<H>  ----------------------------<  128<H>  4.0   |  31  |  6.2<HH>    Ca    9.2      05 Mar 2018 14:28    TPro  6.2  /  Alb  3.1  /  TBili  0.8  /  DBili  x   /  AST  13  /  ALT  10  /  AlkPhos  108  03-05    CARDIAC MARKERS ( 05 Mar 2018 14:28 )  0.03 ng/mL / x     / 26 U/L / x     / 3.1 ng/mL      MEDICATIONS  (STANDING):  aspirin  chewable 81 milliGRAM(s) Oral daily  atorvastatin 10 milliGRAM(s) Oral at bedtime  azithromycin  IVPB 500 milliGRAM(s) IV Intermittent every 24 hours  cefepime Injectable. 1000 milliGRAM(s) IV Push daily  clopidogrel Tablet 75 milliGRAM(s) Oral daily  heparin  Injectable 5000 Unit(s) SubCutaneous every 8 hours  predniSONE   Tablet 50 milliGRAM(s) Oral daily    MEDICATIONS  (PRN):  ALBUTerol   0.5% 2.5 milliGRAM(s) Nebulizer four times a day PRN Shortness of Breath        Consultant(s) Notes Reviewed:  [x ] YES  [ ] NO  Care Discussed with Consultants/Other Providers [ x] YES  [ ] NO        RADIOLOGY & ADDITIONAL TESTS:      Imaging Personally Reviewed:  [ ] YES  [ ] NO    HEALTH ISSUES - PROBLEM Dx:  Chronic stable angina: Chronic stable angina  End stage renal disease: End stage renal disease  Coronary artery disease: Coronary artery disease  Lung cancer: Lung cancer  Pneumonia: Pneumonia          Case discussed with housestaff

## 2018-03-07 NOTE — PROGRESS NOTE ADULT - PROBLEM SELECTOR PLAN 1
rocephin/azithromycin, nebulizers, prednisone  f/u cultures  appreciate pulmonary consult  pt stable for D/c home, however refuses to go home because of the snow  will anticipate d/c tomorrow

## 2018-03-07 NOTE — PROGRESS NOTE ADULT - SUBJECTIVE AND OBJECTIVE BOX
Patient was examined during HD treatment.    Hemodialysis Prescription:  	Access: AVF  	Dialyzer: Opti 160  	Blood Flow (mL/Min): 400  	Dialysate Flow (mL/Min): 500  	Target UF (Liters): 1  	Treatment Time: 3 hours  	Potassium: 2K  	Calcium: 2.5

## 2018-03-07 NOTE — PROGRESS NOTE ADULT - ASSESSMENT
End stage renal disease: End stage renal disease  Coronary artery disease: Coronary artery disease  Lung cancer: Lung cancer  Pneumonia: Pneumonia

## 2018-03-07 NOTE — PROGRESS NOTE ADULT - ASSESSMENT
1. ESRD on HD TTS. HD today: 3 hours, opti 160 dialyzer, 2K bath, 1L UF.  2. Pneumonia. On antibiotics.  3. Anemia. Adequate Hgb. No need for SHELLIE.  4. Disposition. OK for Dc from renal standpoint.

## 2018-03-08 VITALS — SYSTOLIC BLOOD PRESSURE: 126 MMHG | DIASTOLIC BLOOD PRESSURE: 66 MMHG | HEART RATE: 83 BPM

## 2018-03-08 RX ADMIN — AZITHROMYCIN 255 MILLIGRAM(S): 500 TABLET, FILM COATED ORAL at 04:41

## 2018-03-08 RX ADMIN — Medication 50 MILLIGRAM(S): at 05:00

## 2018-03-08 NOTE — PROGRESS NOTE ADULT - SUBJECTIVE AND OBJECTIVE BOX
Patient was examined during HD treatment.    Hemodialysis Prescription:  	Access: LUE AVF  	Dialyzer: Opti 160  	Blood Flow (mL/Min): 400  	Dialysate Flow (mL/Min): 500  	Target UF (Liters): 1  	Treatment Time: 2 hours  	Potassium: 2K  	Calcium: 2.5

## 2018-03-08 NOTE — PROGRESS NOTE ADULT - ASSESSMENT
1. ESRD on HD TTS. HD today: 2 hours, opti 160 dialyzer, 2K bath, 1L UF.  2. Pneumonia. On antibiotics.  3. Anemia. Adequate Hgb. No need for SHELLIE.  4. Disposition. OK for Dc from renal standpoint.

## 2018-03-13 DIAGNOSIS — I10 ESSENTIAL (PRIMARY) HYPERTENSION: ICD-10-CM

## 2018-03-13 DIAGNOSIS — Z53.29 PROCEDURE AND TREATMENT NOT CARRIED OUT BECAUSE OF PATIENT'S DECISION FOR OTHER REASONS: ICD-10-CM

## 2018-03-13 DIAGNOSIS — J44.0 CHRONIC OBSTRUCTIVE PULMONARY DISEASE WITH (ACUTE) LOWER RESPIRATORY INFECTION: ICD-10-CM

## 2018-03-13 DIAGNOSIS — I25.119 ATHEROSCLEROTIC HEART DISEASE OF NATIVE CORONARY ARTERY WITH UNSPECIFIED ANGINA PECTORIS: ICD-10-CM

## 2018-03-13 DIAGNOSIS — D64.9 ANEMIA, UNSPECIFIED: ICD-10-CM

## 2018-03-13 DIAGNOSIS — Z79.82 LONG TERM (CURRENT) USE OF ASPIRIN: ICD-10-CM

## 2018-03-13 DIAGNOSIS — Z79.02 LONG TERM (CURRENT) USE OF ANTITHROMBOTICS/ANTIPLATELETS: ICD-10-CM

## 2018-03-13 DIAGNOSIS — J18.9 PNEUMONIA, UNSPECIFIED ORGANISM: ICD-10-CM

## 2018-03-13 DIAGNOSIS — Z87.891 PERSONAL HISTORY OF NICOTINE DEPENDENCE: ICD-10-CM

## 2018-03-13 DIAGNOSIS — Z99.2 DEPENDENCE ON RENAL DIALYSIS: ICD-10-CM

## 2018-03-13 DIAGNOSIS — J98.11 ATELECTASIS: ICD-10-CM

## 2018-03-13 DIAGNOSIS — J44.1 CHRONIC OBSTRUCTIVE PULMONARY DISEASE WITH (ACUTE) EXACERBATION: ICD-10-CM

## 2018-03-13 DIAGNOSIS — N18.6 END STAGE RENAL DISEASE: ICD-10-CM

## 2018-03-13 DIAGNOSIS — C34.90 MALIGNANT NEOPLASM OF UNSPECIFIED PART OF UNSPECIFIED BRONCHUS OR LUNG: ICD-10-CM

## 2019-03-11 PROBLEM — J44.9 CHRONIC OBSTRUCTIVE PULMONARY DISEASE, UNSPECIFIED: Chronic | Status: ACTIVE | Noted: 2018-03-05

## 2019-03-11 PROBLEM — I25.10 ATHEROSCLEROTIC HEART DISEASE OF NATIVE CORONARY ARTERY WITHOUT ANGINA PECTORIS: Chronic | Status: ACTIVE | Noted: 2018-03-05

## 2019-03-11 PROBLEM — C34.90 MALIGNANT NEOPLASM OF UNSPECIFIED PART OF UNSPECIFIED BRONCHUS OR LUNG: Chronic | Status: ACTIVE | Noted: 2018-03-05

## 2019-03-11 PROBLEM — N18.6 END STAGE RENAL DISEASE: Chronic | Status: ACTIVE | Noted: 2018-03-05

## 2019-03-22 ENCOUNTER — OUTPATIENT (OUTPATIENT)
Dept: OUTPATIENT SERVICES | Facility: HOSPITAL | Age: 79
LOS: 1 days | Discharge: HOME | End: 2019-03-22

## 2019-03-22 VITALS
SYSTOLIC BLOOD PRESSURE: 125 MMHG | TEMPERATURE: 97 F | HEIGHT: 63 IN | OXYGEN SATURATION: 96 % | WEIGHT: 145.06 LBS | RESPIRATION RATE: 17 BRPM | DIASTOLIC BLOOD PRESSURE: 67 MMHG | HEART RATE: 88 BPM

## 2019-03-22 VITALS — RESPIRATION RATE: 18 BRPM | SYSTOLIC BLOOD PRESSURE: 100 MMHG | HEART RATE: 86 BPM | DIASTOLIC BLOOD PRESSURE: 52 MMHG

## 2019-03-22 DIAGNOSIS — J98.19 OTHER PULMONARY COLLAPSE: Chronic | ICD-10-CM

## 2019-03-22 NOTE — ASU PREOP CHECKLIST - TO WHOM
opportunity for questions and answers rendered CHRIS Medina RN opportunity for questions and answers rendered

## 2019-03-28 NOTE — ASU PATIENT PROFILE, ADULT - PMH
Acute hemodialysis patient    COPD (chronic obstructive pulmonary disease)    Coronary artery disease    End stage renal disease    H/O hypercholesterolemia    History of MI (myocardial infarction)    Lung cancer

## 2019-03-28 NOTE — ASU PATIENT PROFILE, ADULT - PSH
AV fistula  left arm  Cataract extraction status of eye, left    History of coronary artery stent placement    Lung collapse

## 2019-03-29 ENCOUNTER — OUTPATIENT (OUTPATIENT)
Dept: OUTPATIENT SERVICES | Facility: HOSPITAL | Age: 79
LOS: 1 days | Discharge: HOME | End: 2019-03-29

## 2019-03-29 VITALS
SYSTOLIC BLOOD PRESSURE: 119 MMHG | RESPIRATION RATE: 18 BRPM | OXYGEN SATURATION: 94 % | WEIGHT: 145.06 LBS | TEMPERATURE: 97 F | DIASTOLIC BLOOD PRESSURE: 57 MMHG | HEIGHT: 63 IN | HEART RATE: 79 BPM

## 2019-03-29 VITALS — RESPIRATION RATE: 17 BRPM | DIASTOLIC BLOOD PRESSURE: 60 MMHG | HEART RATE: 80 BPM | SYSTOLIC BLOOD PRESSURE: 92 MMHG

## 2019-03-29 DIAGNOSIS — Z85.118 PERSONAL HISTORY OF OTHER MALIGNANT NEOPLASM OF BRONCHUS AND LUNG: ICD-10-CM

## 2019-03-29 DIAGNOSIS — H26.9 UNSPECIFIED CATARACT: ICD-10-CM

## 2019-03-29 DIAGNOSIS — N18.6 END STAGE RENAL DISEASE: ICD-10-CM

## 2019-03-29 DIAGNOSIS — J98.19 OTHER PULMONARY COLLAPSE: Chronic | ICD-10-CM

## 2019-03-29 DIAGNOSIS — J44.9 CHRONIC OBSTRUCTIVE PULMONARY DISEASE, UNSPECIFIED: ICD-10-CM

## 2019-03-29 DIAGNOSIS — Z79.82 LONG TERM (CURRENT) USE OF ASPIRIN: ICD-10-CM

## 2019-03-29 DIAGNOSIS — Z82.49 FAMILY HISTORY OF ISCHEMIC HEART DISEASE AND OTHER DISEASES OF THE CIRCULATORY SYSTEM: ICD-10-CM

## 2019-03-29 DIAGNOSIS — Z98.42 CATARACT EXTRACTION STATUS, LEFT EYE: Chronic | ICD-10-CM

## 2019-03-29 DIAGNOSIS — H25.12 AGE-RELATED NUCLEAR CATARACT, LEFT EYE: ICD-10-CM

## 2019-03-29 DIAGNOSIS — Z82.61 FAMILY HISTORY OF ARTHRITIS: ICD-10-CM

## 2019-03-29 DIAGNOSIS — I77.0 ARTERIOVENOUS FISTULA, ACQUIRED: Chronic | ICD-10-CM

## 2019-03-29 DIAGNOSIS — Z82.5 FAMILY HISTORY OF ASTHMA AND OTHER CHRONIC LOWER RESPIRATORY DISEASES: ICD-10-CM

## 2019-03-29 DIAGNOSIS — I25.10 ATHEROSCLEROTIC HEART DISEASE OF NATIVE CORONARY ARTERY WITHOUT ANGINA PECTORIS: ICD-10-CM

## 2019-03-29 DIAGNOSIS — Z79.02 LONG TERM (CURRENT) USE OF ANTITHROMBOTICS/ANTIPLATELETS: ICD-10-CM

## 2019-03-29 DIAGNOSIS — Z95.5 PRESENCE OF CORONARY ANGIOPLASTY IMPLANT AND GRAFT: Chronic | ICD-10-CM

## 2019-04-04 DIAGNOSIS — J44.9 CHRONIC OBSTRUCTIVE PULMONARY DISEASE, UNSPECIFIED: ICD-10-CM

## 2019-04-04 DIAGNOSIS — H25.89 OTHER AGE-RELATED CATARACT: ICD-10-CM

## 2019-04-04 DIAGNOSIS — I25.10 ATHEROSCLEROTIC HEART DISEASE OF NATIVE CORONARY ARTERY WITHOUT ANGINA PECTORIS: ICD-10-CM

## 2019-07-22 ENCOUNTER — EMERGENCY (EMERGENCY)
Facility: HOSPITAL | Age: 79
LOS: 0 days | Discharge: HOME | End: 2019-07-22
Attending: EMERGENCY MEDICINE | Admitting: EMERGENCY MEDICINE
Payer: MEDICARE

## 2019-07-22 VITALS
SYSTOLIC BLOOD PRESSURE: 131 MMHG | DIASTOLIC BLOOD PRESSURE: 61 MMHG | OXYGEN SATURATION: 98 % | TEMPERATURE: 97 F | RESPIRATION RATE: 18 BRPM | HEART RATE: 78 BPM

## 2019-07-22 VITALS
HEART RATE: 79 BPM | DIASTOLIC BLOOD PRESSURE: 63 MMHG | WEIGHT: 139.99 LBS | SYSTOLIC BLOOD PRESSURE: 135 MMHG | HEIGHT: 63 IN | OXYGEN SATURATION: 98 % | TEMPERATURE: 97 F | RESPIRATION RATE: 18 BRPM

## 2019-07-22 DIAGNOSIS — Z95.5 PRESENCE OF CORONARY ANGIOPLASTY IMPLANT AND GRAFT: Chronic | ICD-10-CM

## 2019-07-22 DIAGNOSIS — Z79.82 LONG TERM (CURRENT) USE OF ASPIRIN: ICD-10-CM

## 2019-07-22 DIAGNOSIS — L97.519 NON-PRESSURE CHRONIC ULCER OF OTHER PART OF RIGHT FOOT WITH UNSPECIFIED SEVERITY: ICD-10-CM

## 2019-07-22 DIAGNOSIS — Z79.2 LONG TERM (CURRENT) USE OF ANTIBIOTICS: ICD-10-CM

## 2019-07-22 DIAGNOSIS — Z79.02 LONG TERM (CURRENT) USE OF ANTITHROMBOTICS/ANTIPLATELETS: ICD-10-CM

## 2019-07-22 DIAGNOSIS — Z95.5 PRESENCE OF CORONARY ANGIOPLASTY IMPLANT AND GRAFT: ICD-10-CM

## 2019-07-22 DIAGNOSIS — I25.10 ATHEROSCLEROTIC HEART DISEASE OF NATIVE CORONARY ARTERY WITHOUT ANGINA PECTORIS: ICD-10-CM

## 2019-07-22 DIAGNOSIS — Z79.899 OTHER LONG TERM (CURRENT) DRUG THERAPY: ICD-10-CM

## 2019-07-22 DIAGNOSIS — I77.0 ARTERIOVENOUS FISTULA, ACQUIRED: Chronic | ICD-10-CM

## 2019-07-22 DIAGNOSIS — Z98.42 CATARACT EXTRACTION STATUS, LEFT EYE: Chronic | ICD-10-CM

## 2019-07-22 DIAGNOSIS — J98.19 OTHER PULMONARY COLLAPSE: Chronic | ICD-10-CM

## 2019-07-22 DIAGNOSIS — I25.2 OLD MYOCARDIAL INFARCTION: ICD-10-CM

## 2019-07-22 DIAGNOSIS — M79.671 PAIN IN RIGHT FOOT: ICD-10-CM

## 2019-07-22 DIAGNOSIS — E78.00 PURE HYPERCHOLESTEROLEMIA, UNSPECIFIED: ICD-10-CM

## 2019-07-22 DIAGNOSIS — M79.673 PAIN IN UNSPECIFIED FOOT: ICD-10-CM

## 2019-07-22 PROBLEM — Z86.39 PERSONAL HISTORY OF OTHER ENDOCRINE, NUTRITIONAL AND METABOLIC DISEASE: Chronic | Status: ACTIVE | Noted: 2019-03-29

## 2019-07-22 PROBLEM — Z99.2 DEPENDENCE ON RENAL DIALYSIS: Chronic | Status: ACTIVE | Noted: 2019-03-29

## 2019-07-22 LAB
ALBUMIN SERPL ELPH-MCNC: 4.4 G/DL — SIGNIFICANT CHANGE UP (ref 3.5–5.2)
ALP SERPL-CCNC: 124 U/L — HIGH (ref 30–115)
ALT FLD-CCNC: 6 U/L — SIGNIFICANT CHANGE UP (ref 0–41)
ANION GAP SERPL CALC-SCNC: 12 MMOL/L — SIGNIFICANT CHANGE UP (ref 7–14)
APTT BLD: 26.3 SEC — LOW (ref 27–39.2)
AST SERPL-CCNC: 16 U/L — SIGNIFICANT CHANGE UP (ref 0–41)
BASOPHILS # BLD AUTO: 0.02 K/UL — SIGNIFICANT CHANGE UP (ref 0–0.2)
BASOPHILS NFR BLD AUTO: 0.5 % — SIGNIFICANT CHANGE UP (ref 0–1)
BILIRUB SERPL-MCNC: 0.4 MG/DL — SIGNIFICANT CHANGE UP (ref 0.2–1.2)
BUN SERPL-MCNC: 30 MG/DL — HIGH (ref 10–20)
CALCIUM SERPL-MCNC: 9.8 MG/DL — SIGNIFICANT CHANGE UP (ref 8.5–10.1)
CHLORIDE SERPL-SCNC: 100 MMOL/L — SIGNIFICANT CHANGE UP (ref 98–110)
CO2 SERPL-SCNC: 32 MMOL/L — SIGNIFICANT CHANGE UP (ref 17–32)
CREAT SERPL-MCNC: 6 MG/DL — CRITICAL HIGH (ref 0.7–1.5)
EOSINOPHIL # BLD AUTO: 0.06 K/UL — SIGNIFICANT CHANGE UP (ref 0–0.7)
EOSINOPHIL NFR BLD AUTO: 1.4 % — SIGNIFICANT CHANGE UP (ref 0–8)
GLUCOSE SERPL-MCNC: 86 MG/DL — SIGNIFICANT CHANGE UP (ref 70–99)
HCT VFR BLD CALC: 33.9 % — LOW (ref 42–52)
HGB BLD-MCNC: 10.3 G/DL — LOW (ref 14–18)
IMM GRANULOCYTES NFR BLD AUTO: 0.2 % — SIGNIFICANT CHANGE UP (ref 0.1–0.3)
INR BLD: 1.09 RATIO — SIGNIFICANT CHANGE UP (ref 0.65–1.3)
LYMPHOCYTES # BLD AUTO: 0.67 K/UL — LOW (ref 1.2–3.4)
LYMPHOCYTES # BLD AUTO: 16 % — LOW (ref 20.5–51.1)
MCHC RBC-ENTMCNC: 30.4 G/DL — LOW (ref 32–37)
MCHC RBC-ENTMCNC: 33.1 PG — HIGH (ref 27–31)
MCV RBC AUTO: 109 FL — HIGH (ref 80–94)
MONOCYTES # BLD AUTO: 0.42 K/UL — SIGNIFICANT CHANGE UP (ref 0.1–0.6)
MONOCYTES NFR BLD AUTO: 10 % — HIGH (ref 1.7–9.3)
NEUTROPHILS # BLD AUTO: 3.01 K/UL — SIGNIFICANT CHANGE UP (ref 1.4–6.5)
NEUTROPHILS NFR BLD AUTO: 71.9 % — SIGNIFICANT CHANGE UP (ref 42.2–75.2)
NRBC # BLD: 0 /100 WBCS — SIGNIFICANT CHANGE UP (ref 0–0)
PLATELET # BLD AUTO: 83 K/UL — LOW (ref 130–400)
POTASSIUM SERPL-MCNC: 6.1 MMOL/L — CRITICAL HIGH (ref 3.5–5)
POTASSIUM SERPL-SCNC: 6.1 MMOL/L — CRITICAL HIGH (ref 3.5–5)
PROT SERPL-MCNC: 7.1 G/DL — SIGNIFICANT CHANGE UP (ref 6–8)
PROTHROM AB SERPL-ACNC: 12.5 SEC — SIGNIFICANT CHANGE UP (ref 9.95–12.87)
RBC # BLD: 3.11 M/UL — LOW (ref 4.7–6.1)
RBC # FLD: 15.4 % — HIGH (ref 11.5–14.5)
SODIUM SERPL-SCNC: 144 MMOL/L — SIGNIFICANT CHANGE UP (ref 135–146)
WBC # BLD: 4.19 K/UL — LOW (ref 4.8–10.8)
WBC # FLD AUTO: 4.19 K/UL — LOW (ref 4.8–10.8)

## 2019-07-22 PROCEDURE — 99284 EMERGENCY DEPT VISIT MOD MDM: CPT

## 2019-07-22 PROCEDURE — 93970 EXTREMITY STUDY: CPT | Mod: 26

## 2019-07-22 PROCEDURE — 73620 X-RAY EXAM OF FOOT: CPT | Mod: 26,RT

## 2019-07-22 RX ORDER — ASPIRIN/CALCIUM CARB/MAGNESIUM 324 MG
1 TABLET ORAL
Qty: 0 | Refills: 0 | DISCHARGE

## 2019-07-22 RX ORDER — MUPIROCIN 20 MG/G
1 OINTMENT TOPICAL
Qty: 0 | Refills: 0 | DISCHARGE

## 2019-07-22 RX ORDER — CINACALCET 30 MG/1
1 TABLET, FILM COATED ORAL
Qty: 0 | Refills: 0 | DISCHARGE

## 2019-07-22 RX ORDER — CLOPIDOGREL BISULFATE 75 MG/1
1 TABLET, FILM COATED ORAL
Qty: 0 | Refills: 0 | DISCHARGE

## 2019-07-22 RX ORDER — OXYCODONE AND ACETAMINOPHEN 5; 325 MG/1; MG/1
1 TABLET ORAL ONCE
Refills: 0 | Status: DISCONTINUED | OUTPATIENT
Start: 2019-07-22 | End: 2019-07-22

## 2019-07-22 RX ORDER — METOPROLOL TARTRATE 50 MG
1 TABLET ORAL
Qty: 0 | Refills: 0 | DISCHARGE

## 2019-07-22 RX ADMIN — OXYCODONE AND ACETAMINOPHEN 1 TABLET(S): 5; 325 TABLET ORAL at 17:45

## 2019-07-22 NOTE — ED PROVIDER NOTE - NSFOLLOWUPCLINICS_GEN_ALL_ED_FT
[facility]  Podiatry  .  [place]   Phone: [phone]  Fax:   Follow Up Time:

## 2019-07-22 NOTE — ED PROVIDER NOTE - NS ED ROS FT
Review of Systems:  	•	CONSTITUTIONAL - no fever, no diaphoresis, no chills  	•	SKIN - no rash  	•	HEMATOLOGIC - no bleeding, no bruising  	•	EYES - no eye pain, no blurry vision  	•	ENT - no change in hearing, no sore throat, no ear pain or tinnitus  	•	RESPIRATORY - no shortness of breath, no cough  	•	CARDIAC - no chest pain, no palpitations  	•	GI - no abd pain, no nausea, no vomiting, no diarrhea, no constipation  	  	•	MUSCULOSKELETAL - foot pain , no swelling, no redness  	•	NEUROLOGIC - no weakness, no headache, no paresthesias, no LOC  	•	PSYCH - no anxiety, non suicidal, non homicidal, no hallucination, no depression

## 2019-07-22 NOTE — ED PROVIDER NOTE - OBJECTIVE STATEMENT
this is 80 yo male presents to ed for evaluation of right foot pain. patient states he is having pain in his right foot for months. patient had seen vascular and was told he had a blockage. patient states they were not able to help him . patient states that he has deveopled ulcers on his feet. patient has seen many podiarist and was given antibiotics . patient states it improved. patient states he has always had pain. but wants to know why today

## 2019-07-22 NOTE — ED PROVIDER NOTE - ATTENDING CONTRIBUTION TO CARE
Patient with persistent foot pain with improved but not resolved foot ulcers, followed in [place] but stays with a local friend on the weekends who wants him evaluated here, on exam pt well appearing and afebrile with stigmata of PVD and dry ulcers to dorsum of foot, no warmth/lyphangitis, labs reviewed, will d/c to f/u with vascular. Patient counseled regarding conditions which should prompt return.

## 2019-07-22 NOTE — ED PROVIDER NOTE - PHYSICAL EXAMINATION
--EXAM--  VITAL SIGNS: I have reviewed vs documented at present.  CONSTITUTIONAL: Well-developed; well-nourished; in no acute distress.   SKIN: Warm and dry, no acute rash.   HEAD: Normocephalic; atraumatic.  EYES: PERRL, EOM intact; conjunctiva and sclera clear. No nystagmus.  ENT: No nasal discharge; airway clear.  NECK: Supple; non tender.  CARD: S1, S2, Regular rate and rhythm.   RESP: No wheezes, rales or rhonchi.  ABD: Normal bowel sounds; soft; non-distended; non-tender.  EXT: right foot there are vascular ulcers noted no surrounding erythema and no drainage   NEURO: Alert, oriented, grossly unremarkable. Strength 5/5 in all extremities. Sensation intact throughout.  PSYCH: Cooperative, appropriate.

## 2019-07-22 NOTE — ED ADULT NURSE NOTE - NSIMPLEMENTINTERV_GEN_ALL_ED
Implemented All Universal Safety Interventions:  [place] to call system. Call bell, personal items and telephone within reach. Instruct patient to call for assistance. Room bathroom lighting operational. Non-slip footwear when patient is off stretcher. Physically safe environment: no spills, clutter or unnecessary equipment. Stretcher in lowest position, wheels locked, appropriate side rails in place.

## 2019-07-22 NOTE — ED PROVIDER NOTE - PSH
AV fistula  left arm  Cataract extraction status of eye, left    History of coronary artery stent placement    Lung collapse Detail Level: Generalized Include Location In Plan?: No

## 2019-07-22 NOTE — ED ADULT NURSE NOTE - EXTENSIONS OF SELF_ADULT
[FreeTextEntry1] : \par Urinary frequency:\par Ucx obtained\par Prescribed Cipro 500 mg BId x 7 days + Diflucan 150 mg, one time dose\par Will follow up with urine\par \par Postmenopausal bleeding:\par Sent for pelvic transvaginal ultrasound to check the lining of her endometrium None

## 2019-07-22 NOTE — ED PROVIDER NOTE - CARE PROVIDER_API CALL
Louie Dunham)  Vascular Surgery  [address] [place] [address]  Phone: [phone]  Fax: [phone]  Follow Up Time:

## 2019-08-30 ENCOUNTER — EMERGENCY (EMERGENCY)
Facility: HOSPITAL | Age: 79
LOS: 0 days | Discharge: ANOTHER HOSPITAL | End: 2019-08-30
Attending: EMERGENCY MEDICINE | Admitting: EMERGENCY MEDICINE
Payer: MEDICARE

## 2019-08-30 VITALS
RESPIRATION RATE: 18 BRPM | SYSTOLIC BLOOD PRESSURE: 123 MMHG | HEART RATE: 79 BPM | OXYGEN SATURATION: 96 % | DIASTOLIC BLOOD PRESSURE: 50 MMHG

## 2019-08-30 VITALS
HEIGHT: 64 IN | RESPIRATION RATE: 20 BRPM | TEMPERATURE: 98 F | WEIGHT: 145.06 LBS | HEART RATE: 94 BPM | SYSTOLIC BLOOD PRESSURE: 120 MMHG | DIASTOLIC BLOOD PRESSURE: 56 MMHG | OXYGEN SATURATION: 98 %

## 2019-08-30 DIAGNOSIS — Z95.5 PRESENCE OF CORONARY ANGIOPLASTY IMPLANT AND GRAFT: Chronic | ICD-10-CM

## 2019-08-30 DIAGNOSIS — R63.0 ANOREXIA: ICD-10-CM

## 2019-08-30 DIAGNOSIS — R53.1 WEAKNESS: ICD-10-CM

## 2019-08-30 DIAGNOSIS — J98.19 OTHER PULMONARY COLLAPSE: Chronic | ICD-10-CM

## 2019-08-30 DIAGNOSIS — R20.0 ANESTHESIA OF SKIN: ICD-10-CM

## 2019-08-30 DIAGNOSIS — Z98.42 CATARACT EXTRACTION STATUS, LEFT EYE: Chronic | ICD-10-CM

## 2019-08-30 DIAGNOSIS — I77.0 ARTERIOVENOUS FISTULA, ACQUIRED: Chronic | ICD-10-CM

## 2019-08-30 LAB
ALBUMIN SERPL ELPH-MCNC: 4.3 G/DL — SIGNIFICANT CHANGE UP (ref 3.5–5.2)
ALP SERPL-CCNC: 193 U/L — HIGH (ref 30–115)
ALT FLD-CCNC: 468 U/L — HIGH (ref 0–41)
ANION GAP SERPL CALC-SCNC: 29 MMOL/L — HIGH (ref 7–14)
APTT BLD: 33 SEC — SIGNIFICANT CHANGE UP (ref 27–39.2)
AST SERPL-CCNC: 655 U/L — HIGH (ref 0–41)
BASE EXCESS BLDV CALC-SCNC: -4.2 MMOL/L — LOW (ref -2–2)
BASOPHILS # BLD AUTO: 0.04 K/UL — SIGNIFICANT CHANGE UP (ref 0–0.2)
BASOPHILS NFR BLD AUTO: 0.3 % — SIGNIFICANT CHANGE UP (ref 0–1)
BILIRUB SERPL-MCNC: 1.2 MG/DL — SIGNIFICANT CHANGE UP (ref 0.2–1.2)
BLD GP AB SCN SERPL QL: SIGNIFICANT CHANGE UP
BUN SERPL-MCNC: 49 MG/DL — HIGH (ref 10–20)
CA-I SERPL-SCNC: 1.13 MMOL/L — SIGNIFICANT CHANGE UP (ref 1.12–1.3)
CALCIUM SERPL-MCNC: 9.9 MG/DL — SIGNIFICANT CHANGE UP (ref 8.5–10.1)
CHLORIDE SERPL-SCNC: 94 MMOL/L — LOW (ref 98–110)
CK SERPL-CCNC: 132 U/L — SIGNIFICANT CHANGE UP (ref 0–225)
CO2 SERPL-SCNC: 20 MMOL/L — SIGNIFICANT CHANGE UP (ref 17–32)
CREAT SERPL-MCNC: 6.7 MG/DL — CRITICAL HIGH (ref 0.7–1.5)
EOSINOPHIL # BLD AUTO: 0.01 K/UL — SIGNIFICANT CHANGE UP (ref 0–0.7)
EOSINOPHIL NFR BLD AUTO: 0.1 % — SIGNIFICANT CHANGE UP (ref 0–8)
GAS PNL BLDV: 144 MMOL/L — SIGNIFICANT CHANGE UP (ref 136–145)
GAS PNL BLDV: SIGNIFICANT CHANGE UP
GLUCOSE SERPL-MCNC: 47 MG/DL — LOW (ref 70–99)
HCO3 BLDV-SCNC: 22 MMOL/L — SIGNIFICANT CHANGE UP (ref 22–29)
HCT VFR BLD CALC: 30.2 % — LOW (ref 42–52)
HCT VFR BLDA CALC: 25.6 % — LOW (ref 34–44)
HGB BLD CALC-MCNC: 8.3 G/DL — LOW (ref 14–18)
HGB BLD-MCNC: 9.3 G/DL — LOW (ref 14–18)
HOROWITZ INDEX BLDV+IHG-RTO: 21 — SIGNIFICANT CHANGE UP
IMM GRANULOCYTES NFR BLD AUTO: 0.5 % — HIGH (ref 0.1–0.3)
INR BLD: 1.62 RATIO — HIGH (ref 0.65–1.3)
LACTATE BLDV-MCNC: 8.1 MMOL/L — HIGH (ref 0.5–1.6)
LACTATE SERPL-SCNC: 8.5 MMOL/L — CRITICAL HIGH (ref 0.5–2.2)
LYMPHOCYTES # BLD AUTO: 1.02 K/UL — LOW (ref 1.2–3.4)
LYMPHOCYTES # BLD AUTO: 7.2 % — LOW (ref 20.5–51.1)
MAGNESIUM SERPL-MCNC: 2.6 MG/DL — HIGH (ref 1.8–2.4)
MCHC RBC-ENTMCNC: 30.8 G/DL — LOW (ref 32–37)
MCHC RBC-ENTMCNC: 33.2 PG — HIGH (ref 27–31)
MCV RBC AUTO: 107.9 FL — HIGH (ref 80–94)
MONOCYTES # BLD AUTO: 1.32 K/UL — HIGH (ref 0.1–0.6)
MONOCYTES NFR BLD AUTO: 9.4 % — HIGH (ref 1.7–9.3)
NEUTROPHILS # BLD AUTO: 11.65 K/UL — HIGH (ref 1.4–6.5)
NEUTROPHILS NFR BLD AUTO: 82.5 % — HIGH (ref 42.2–75.2)
NRBC # BLD: 0 /100 WBCS — SIGNIFICANT CHANGE UP (ref 0–0)
PCO2 BLDV: 43 MMHG — SIGNIFICANT CHANGE UP (ref 41–51)
PH BLDV: 7.31 — SIGNIFICANT CHANGE UP (ref 7.26–7.43)
PLATELET # BLD AUTO: 265 K/UL — SIGNIFICANT CHANGE UP (ref 130–400)
PO2 BLDV: 22 MMHG — SIGNIFICANT CHANGE UP (ref 20–40)
POTASSIUM BLDV-SCNC: 6.3 MMOL/L — HIGH (ref 3.3–5.6)
POTASSIUM SERPL-MCNC: 6.7 MMOL/L — CRITICAL HIGH (ref 3.5–5)
POTASSIUM SERPL-SCNC: 6.7 MMOL/L — CRITICAL HIGH (ref 3.5–5)
PROT SERPL-MCNC: 7 G/DL — SIGNIFICANT CHANGE UP (ref 6–8)
PROTHROM AB SERPL-ACNC: 18.5 SEC — HIGH (ref 9.95–12.87)
RBC # BLD: 2.8 M/UL — LOW (ref 4.7–6.1)
RBC # FLD: 15 % — HIGH (ref 11.5–14.5)
SAO2 % BLDV: 17 % — SIGNIFICANT CHANGE UP
SODIUM SERPL-SCNC: 143 MMOL/L — SIGNIFICANT CHANGE UP (ref 135–146)
TROPONIN T SERPL-MCNC: 0.46 NG/ML — CRITICAL HIGH
WBC # BLD: 14.11 K/UL — HIGH (ref 4.8–10.8)
WBC # FLD AUTO: 14.11 K/UL — HIGH (ref 4.8–10.8)

## 2019-08-30 PROCEDURE — 99285 EMERGENCY DEPT VISIT HI MDM: CPT

## 2019-08-30 RX ORDER — DEXTROSE 50 % IN WATER 50 %
50 SYRINGE (ML) INTRAVENOUS ONCE
Refills: 0 | Status: COMPLETED | OUTPATIENT
Start: 2019-08-30 | End: 2019-08-30

## 2019-08-30 RX ORDER — SODIUM CHLORIDE 9 MG/ML
1000 INJECTION, SOLUTION INTRAVENOUS
Refills: 0 | Status: DISCONTINUED | OUTPATIENT
Start: 2019-08-30 | End: 2019-08-30

## 2019-08-30 RX ORDER — DEXTROSE 50 % IN WATER 50 %
15 SYRINGE (ML) INTRAVENOUS ONCE
Refills: 0 | Status: COMPLETED | OUTPATIENT
Start: 2019-08-30 | End: 2019-08-30

## 2019-08-30 RX ADMIN — Medication 50 MILLILITER(S): at 17:34

## 2019-08-30 RX ADMIN — SODIUM CHLORIDE 1000 MILLILITER(S): 9 INJECTION, SOLUTION INTRAVENOUS at 17:34

## 2019-08-30 RX ADMIN — Medication 15 GRAM(S): at 17:19

## 2019-08-30 NOTE — ED PROVIDER NOTE - PROGRESS NOTE DETAILS
JULIA: spoke with patient's vascular surgeon Dr. óGmez ([phone]). Accepts patient for transfer, direct admit to his service. Transfer center aware, will find bed for patient. 78 yo M presents complaining of b/l LE numbness and weakness that has been increasing since surgery on Monday. Pt had his iliac and femoral arteries cleaned and was discharged from [facility] 2 days ago. Pt called the surgeon and was told to come to the nearest ER for further eval and for possible transfer to [facility]. Pt denies CP, SOB, n/v, abdominal pain, fever, chills. On exam pt in NAD, AAO x3, PERRL, EOMI, no lad, neck supple, OP clear, MMM, Lungs CTA B/L, no wrr, no mur, Abd is soft, + BS, NT ND, no edema, good ROM x all ext, no rash.  DP pulses not palpable, pt wiggling toes, decreased sensation b/l, no necrosis of skin. JULIA: spoke with patient's vascular surgeon Dr. Gómez ([phone]). Accepts patient for transfer, direct admit to his service. Transfer center ([phone]) [facility], will find bed for patient. JULIA: Patient accepted for transfer at [facility], admitted to Dr. Gómez's service. Sign out given to PA. [address] [place]

## 2019-08-30 NOTE — ED ADULT NURSE NOTE - NSIMPLEMENTINTERV_GEN_ALL_ED
Implemented All Fall with Harm Risk Interventions:  [place] to call system. Call bell, personal items and telephone within reach. Instruct patient to call for assistance. Room bathroom lighting operational. Non-slip footwear when patient is off stretcher. Physically safe environment: no spills, clutter or unnecessary equipment. Stretcher in lowest position, wheels locked, appropriate side rails in place. Provide visual cue, wrist band, yellow gown, etc. Monitor gait and stability. Monitor for mental status changes and reorient to person, place, and time. Review medications for side effects contributing to fall risk. Reinforce activity limits and safety measures with patient and family. Provide visual clues: red socks.

## 2019-08-30 NOTE — ED PROVIDER NOTE - NS ED ROS FT
CONSTITUTIONAL: (-) fevers, (-) chills, (+) decreased appetite  ENT: (-) congestion, (-) rhinorrhea, (-) sore throat  NECK: (-) neck pain, (-) neck stiffness, (-) lymphadenopathy  CARDIO: (-) chest pain, (-) palpitations  PULM: (-) cough, (-) sputum, (-) shortness of breath  GI: (-) nausea, (-) vomiting, (-) diarrhea, (-) constipation, (-) abdominal pain, (-) melena, (-) hematochezia  : (-) dysuria, (-) hematuria, (-) incontinence, (-) difficulty urinating, (-) urinary retention, (-) flank pain  MSK: see HPI, (-) back pain, (-) myalgias, (-) joint swelling, (-) joint stiffness  SKIN: (-) rashes, (-) wounds, (-) pallor, (-) ecchymosis  NEURO: (-) headache, (-) head injury, (-) LOC, (-) dizziness, (-) lightheadedness, (-) syncope, (+) numbness, (-) weakness, (-) paresthesias    *all other systems negative except as documented above and in the HPI*

## 2019-08-30 NOTE — ED PROVIDER NOTE - NSREASONFORTRANSFER_ED_A_ED
Consultant Request/Pre-existing Relationship/Higher Level of Care or Service Not Available/Patient Request

## 2019-08-30 NOTE — ED PROVIDER NOTE - OBJECTIVE STATEMENT
79 year old male w hx of PVD, ESRD 79 year old male w hx of PVD, ESRD receives dialysis T/Th/Sa, CAD presents to the ED with b/l lower leg weakness and numbness for several months. Patient had surgery with vascular Dr. Gómez on Monday of this week, shockwave surgery to b/l femoral and iliac arteries. Patient was discharged 2 days ago, was feeling improved until last night when his symptoms returned. Patient states he is feeling too weak in his legs to walk today. Denies falls, head injury, LOC, fevers/chills, n/v, abd pain, chest pain, shortness of breath, back pain, saddle paresthesias, incontinence, urinary/bowel retention. Called Dr. Gómez and was recommended to come to nearest ED for transfer to [facility]. Pt admits to decreased po intake since d/c.

## 2019-08-30 NOTE — ED ADULT NURSE REASSESSMENT NOTE - NS ED NURSE REASSESS COMMENT FT1
Pt transferred at this time to [place]. Report given to LASHAWN Benitez on CARMICHAEL 2N. V/S reassessed and documented. Pt AAOx4 breathing RA w/ normal WOB. Pt denies and pain/discomfort.

## 2019-08-30 NOTE — ED PROVIDER NOTE - PHYSICAL EXAMINATION
VITALS:  I have reviewed the initial vital signs.  GENERAL: Well-developed, well-nourished, in no acute distress. Nontoxic.  HEENT: Sclera clear. No conjunctival injection. EOMI, PERRLA. Mucous membranes moist.  NECK: supple w FROM.  CARDIO: RRR, nl S1 and S2. No murmurs, rubs, or gallops. 2+ radial pulses b/l. Absent pedal pulses. +b/l femoral pulses.   PULM: Normal effort. No tachypnea or retractions. CTA b/l without wheezes, rales, or rhonchi.  MSK: Normal, steady gait. FROM to extremities x4. No joint swelling, erythema, or ttp.  GI: Abdomen soft and non-distended.  SKIN: Warm, dry. No pallor or rashes. Capillary refill to toes <2 seconds.  NEURO: A&Ox3. Speech clear. CN II-XII intact. 5/5 strength to upper and lower extremities b/l. Sensation intact and equal throughout.

## 2019-08-30 NOTE — ED ADULT NURSE REASSESSMENT NOTE - NS ED NURSE REASSESS COMMENT FT1
attempted to call for report at [facility] at [phone]. was told by rn at facility that it was change of shift and we will be called back. rn at accepting facility was made aware that transportation was called for pt.

## 2019-09-04 NOTE — DISCHARGE NOTE ADULT - FUNCTIONAL SCREEN CURRENT LEVEL: BATHING, MLM
10/21/19:  Patient was a no show for follow up appointment and is being d/c at this time  Daily Note     Today's date: 2019  Patient name: Ty Escudero  : 2019  MRN: [id number]  Referring provider: Carrie Marx MD  Dx:   Encounter Diagnosis     ICD-10-CM    1  Torticollis M43 6        Start Time: 1000  Stop Time: 923  Total time in clinic (min): 46 minutes    BCBS - used 26 on 19    Subjective: Mother reports patient has been doing well  States almost walking but still wants to hold on  Objective: Patient with excellent midline head position today throughout session  Ambulated from waiting room to swing room with 1 HHA - excellent stepping and improved neutral hip position of FREDERICK MONROE's  Wagon ride for postural control without holding on with excellent sitting balance;   Squat to stand inside barrel taking med balls out and putting in;  Seated postural control on platform swing - needed assist to maintain balance; Worked on transitioning from 1 elevated surface to another with assist to initiate reaching but able to move between without assist   Climbing thru crash pit working on LE strengthening and endurance - excellent climbing today using FREDERICK MONROE's  Assessment: Tolerated treatment well  Patient with improved ambulation with 1 HHA today  Plan: Continue per plan of care 
(0) independent

## 2020-07-08 NOTE — PATIENT PROFILE ADULT. - FUNCTIONAL SCREEN CURRENT LEVEL: AMBULATION, MLM
Telemetry Shift Summary    Rhythm SB/SR  HR Range 50s-60s  Ectopy O-PAC/PVC  Measurements 0.1/0.10/0.40        Normal Values  Rhythm SR  HR Range    Measurements 0.12-0.20 / 0.06-0.10  / 0.30-0.52     (0) independent

## 2022-01-17 NOTE — ASU PREOP CHECKLIST - BOWEL PREP
Patient : Katherin Galvez Age: 82 year old Sex: female   MRN: [id number] Encounter Date: 1/16/2022      History     Chief Complaint   Patient presents with   • Back Pain     82 year old F Katherin Galvez presents to the emergency department for evaluation of back pain.  Patient states she had a long car drive today.  Patient was driving to [place] and back in 1 day.  She was actually visiting there to visit the dispensary because 1 of her doctors recommended a good tracheal ir product that is available in [place] to help with her chronic pain.  She states from being in the car for so long she developed an escalation in her back pain.  She states it feels like a tight band coming around her chest.  It does hurt to breathe as well as to move.  She also states she felt very dehydrated from being in the car for so long.  She states she felt like she could not take the pain any longer and came here for further evaluation.  Patient arrives by ambulance.  Patient states she felt like she was not going to make it home due to the severity of the pain.  She did not take anything for pain but she did try ice with no relief.  Pain is severe. Previous hx of compression fractures.          Allergies   Allergen Reactions   • Buprenorphine DIZZINESS   • Ciprofloxacin Other (See Comments)     achiness   • Cyclobenzaprine Other (See Comments)     Cardiac related   • Demerol Other (See Comments)     hyperactive   • Disopyramide CARDIAC DISTURBANCES     Unsure clear reaction from patient response.    • Epinephrine DIZZINESS   • Meloxicam Other (See Comments) and DIZZINESS   • Penicillins RASH   • Tramadol DIZZINESS   • Fosamax Nausea & Vomiting       Current Discharge Medication List      Prior to Admission Medications    Details   predniSONE (DELTASONE) 20 MG tablet Take 1 tablet by mouth daily.  Qty: 2 tablet, Refills: 0      cetirizine (ZyrTEC) 5 MG tablet Take 1 tablet by mouth daily.  Qty: 5 tablet, Refills: 0      AZITHROMYCIN PO        flecainide (TAMBOCOR) 50 MG tablet TAKE 1 TABLET BY MOUTH DAILY  Qty: 90 tablet, Refills: 2      levothyroxine 25 MCG tablet TAKE 1 TABLET BY MOUTH DAILY  Qty: 90 tablet, Refills: 1      metoPROLOL succinate (TOPROL-XL) 25 MG 24 hr tablet Take 0.5 tablets by mouth 2 times daily.  Qty: 90 tablet, Refills: 1      Insulin Pen Needle (Pen Needles) 32G X 4 MM Misc 1 each nightly. (Use to inject Forteo nightly).  Qty: 100 each, Refills: 3      Forteo 620 MCG/2.48ML Solution Pen-injector Inject 0.08 mLs into the skin nightly.  Qty: 7.2 mL, Refills: 3      folic acid (FOLATE) 1 MG tablet Take 2 tablets by mouth daily.  Qty: 180 tablet, Refills: 0      fluticasone (FLONASE) 50 MCG/ACT nasal spray Spray 1 spray in each nostril 2 times daily.  Qty: 16 g, Refills: 12      acetaminophen (TYLENOL) 500 MG tablet Take 2 tablets by mouth 3 times daily.  Qty: 90 tablet, Refills: 3      Nutritional Supplements (ENSURE PO) Take 1 Can by mouth daily.      saccharomyces boulardii (FLORASTOR) 250 MG capsule Take 250 mg by mouth daily. Take 1 daily      albuterol (PROAIR HFA) 108 (90 Base) MCG/ACT inhaler Inhale 2 puffs into the lungs every 4 hours as needed for Shortness of Breath or Wheezing.  Qty: 1 Inhaler, Refills: 5      albuterol-ipratropium 2.5 mg/0.5 mg (DUONEB) 0.5-2.5 (3) MG/3ML nebulizer solution Take 3 mLs by nebulization every 4 hours as needed for Wheezing.  Qty: 360 mL, Refills: 5      tiotropium (SPIRIVA HANDIHALER) 18 MCG capsule for inhaler Place 1 capsule into inhaler and inhale daily.  Qty: 30 capsule, Refills: 5      budesonide-formoterol (SYMBICORT) 160-4.5 MCG/ACT inhaler Inhale 2 puffs into the lungs 2 times daily.  Qty: 10.2 g, Refills: 5      aspirin 81 MG tablet Take 81 mg by mouth daily.      LORazepam (ATIVAN) 1 MG tablet Take 1 mg by mouth 2 times daily. One tab at bedtime and then may take 1/2 tab if needed also during the day         New Prescriptions    Details   oxyCODONE-acetaminophen (Percocet)  5-325 MG per tablet Take 1-2 tablets by mouth every 6 hours as needed for Pain (Not to exceed 4000 mg acetaminophen per day, MTE 6 tabs per 24 hours).  Qty: 10 tablet, Refills: 0             Past Medical History:   Diagnosis Date   • Allergy    • Anemia, unspecified    • Anxiety    • Basal cell carcinoma    • Basal cell carcinoma (BCC) of left upper extremity 08/17/2021    radial volar left forearm   • Depressive disorder, not elsewhere classified    • Mitral valve disorders(424.0)    • Osteoarthrosis, unspecified whether generalized or localized, unspecified site    • Other malignant neoplasm of skin of trunk, except scrotum    • Personal history of alcoholism (CMS/HCC)    • SCC (squamous cell carcinoma) 08/17/2021    lateral left upper arm   • Squamous cell carcinoma    • Thyroid condition    • Unspecified asthma(493.90)        Past Surgical History:   Procedure Laterality Date   • CHOLECYSTECTOMY     • TUBAL LIGATION         Family History   Problem Relation Age of Onset   • Allergies Brother    • Arthritis Maternal Aunt    • Cancer Maternal Aunt         breast   • Hypertension Sister    • Respiratory Father         emphysema   • Skin Mother         cancer       Social History     Tobacco Use   • Smoking status: Current Every Day Smoker     Packs/day: 0.25     Types: Cigarettes   • Smokeless tobacco: Never Used   • Tobacco comment: 1-7 cigarettes/day    Vaping Use   • Vaping Use: never used   Substance Use Topics   • Alcohol use: Not Currently   • Drug use: No       E-cigarette/Vaping   • E-Cigarette/Vaping Use Never Used    • Counseling Given No      E-Cigarette/Vaping Substances & Devices       Review of Systems   Constitutional: Positive for activity change and fatigue. Negative for fever.   HENT: Negative for congestion and trouble swallowing.    Respiratory: Negative for cough and shortness of breath.    Cardiovascular: Positive for chest pain (posterior thorax pain and pleuritic discomfort).    Gastrointestinal: Negative for abdominal pain and vomiting.   Genitourinary: Negative for difficulty urinating and flank pain.   Musculoskeletal: Positive for back pain. Negative for gait problem.   Skin: Negative for color change and wound.   Neurological: Negative for dizziness and light-headedness.   Psychiatric/Behavioral: Negative for confusion. The patient is not nervous/anxious.        Physical Exam     ED Triage Vitals [01/16/22 1917]   ED Triage Vitals Group      Temp 98 °F (36.7 °C)      Heart Rate 96      Resp 18      BP (!) 152/69      SpO2 94 %      EtCO2 mmHg       Height 5' 2\" (1.575 m)      Weight 89 lb (40.4 kg)      Weight Scale Used       BMI (Calculated) 16.28      IBW/kg (Calculated) 50.1       Physical Exam  Vitals reviewed.   Constitutional:       General: She is not in acute distress.     Comments: Thin, pt leaning forward on ED cart   HENT:      Head: Normocephalic and atraumatic.   Eyes:      Pupils: Pupils are equal, round, and reactive to light.   Cardiovascular:      Rate and Rhythm: Normal rate.      Pulses: Normal pulses.   Pulmonary:      Effort: Pulmonary effort is normal. No respiratory distress.      Comments: Pain with deep inspiration  Abdominal:      Palpations: Abdomen is soft.      Tenderness: There is no abdominal tenderness.   Musculoskeletal:      Cervical back: Normal range of motion.      Comments: Pain with PROM of chest,  Back pain but mostly in posterior thorax/ ribs   Skin:     General: Skin is warm and dry.   Neurological:      General: No focal deficit present.      Mental Status: She is alert and oriented to person, place, and time.   Psychiatric:         Mood and Affect: Mood normal.         Behavior: Behavior normal.         ED Course     Procedures    Lab Results     Results for orders placed or performed during the hospital encounter of 01/16/22   Prothrombin Time   Result Value Ref Range    Prothrombin Time 10.6 9.7 - 11.8 sec    INR 1.0     Partial  Thromboplastin Time   Result Value Ref Range    PTT 25 22 - 30 sec   D Dimer, Quantitative   Result Value Ref Range    D Dimer, Quantitative 0.68 (H) <0.57 mg/L (FEU)   Comprehensive Metabolic Panel   Result Value Ref Range    Fasting Status      Sodium 143 135 - 145 mmol/L    Potassium 4.3 3.4 - 5.1 mmol/L    Chloride 105 98 - 107 mmol/L    Carbon Dioxide 32 21 - 32 mmol/L    Anion Gap 10 10 - 20 mmol/L    Glucose 91 70 - 99 mg/dL    BUN 16 6 - 20 mg/dL    Creatinine 0.57 0.51 - 0.95 mg/dL    Glomerular Filtration Rate 86 >=60    BUN/ Creatinine Ratio 28 (H) 7 - 25    Calcium 8.7 8.4 - 10.2 mg/dL    Bilirubin, Total 0.8 0.2 - 1.0 mg/dL    GOT/AST 20 <=37 Units/L    GPT/ALT 26 <64 Units/L    Alkaline Phosphatase 69 45 - 117 Units/L    Albumin 3.8 3.6 - 5.1 g/dL    Protein, Total 6.6 6.4 - 8.2 g/dL    Globulin 2.8 2.0 - 4.0 g/dL    A/G Ratio 1.4 1.0 - 2.4   Creatine Kinase   Result Value Ref Range    CK 47 26 - 192 Units/L   CBC with Automated Differential (performable only)   Result Value Ref Range    WBC 9.3 4.2 - 11.0 K/mcL    RBC 3.46 (L) 4.00 - 5.20 mil/mcL    HGB 12.3 12.0 - 15.5 g/dL    HCT 36.5 36.0 - 46.5 %    .5 (H) 78.0 - 100.0 fl    MCH 35.5 (H) 26.0 - 34.0 pg    MCHC 33.7 32.0 - 36.5 g/dL    RDW-CV 14.1 11.0 - 15.0 %    RDW-SD 54.5 (H) 39.0 - 50.0 fL     140 - 450 K/mcL    NRBC 0 <=0 /100 WBC    Neutrophil, Percent 54 %    Lymphocytes, Percent 33 %    Mono, Percent 10 %    Eosinophils, Percent 2 %    Basophils, Percent 1 %    Immature Granulocytes 0 %    Absolute Neutrophils 5.0 1.8 - 7.7 K/mcL    Absolute Lymphocytes 3.0 1.0 - 4.0 K/mcL    Absolute Monocytes 1.0 (H) 0.3 - 0.9 K/mcL    Absolute Eosinophils  0.2 0.0 - 0.5 K/mcL    Absolute Basophils 0.1 0.0 - 0.3 K/mcL    Absolute Immmature Granulocytes 0.0 0.0 - 0.2 K/mcL       EKG Results     EKG Interpretation:  ECG shows NSR with no DELL  Rate: 69  Rhythm: normal sinus rhythm   Abnormality: no    EKG tracing preliminarily interpreted by  ED physician    Radiology Results     Imaging Results          CT Chest PE Imaging (Final result)  Result time 01/16/22 21:10:15    Final result                 Impression:    IMPRESSION:    1. No pulmonary emboli.    2. No airspace consolidations, pneumothoraces, or pleural effusions  bilaterally.    3. Emphysematous changes in the lungs bilaterally.    4. Mild peribronchial cuffing identified in the right hilar/infrahilar  region. The findings are nonspecific, however can be seen with small vessel  reactive airway disease or viral etiology.    5. Chronic compression deformities at multiple levels of the mid and lower  thoracic spine. No acute compression deformities.                       Narrative:    CTA OF THE THORAX UNDER PE PROTOCOL    INDICATION:  PE suspected, low/intermediate prob, positive D-dimer, Long  car ride    COMPARISON:  Prior studies with the most recent performed on 11/11/2021.    TECHNIQUE:  CT of the thorax under PE protocol was performed. Sagittal and  coronal reformats obtained. Approximately 82 mL of Omnipaque-350 contrast  was administered intravenously. 3-D MIP reconstructions were performed.    FINDINGS:    No pulmonary arterial intraluminal filling defects are visualized. There is  no evidence for a pulmonary thromboembolism.    Dropout densities the lungs bilaterally consistent with emphysematous  changes.     Scarring/atelectasis at the right lung base.     Small calcified and probable noncalcified granulomas identified in the  lungs bilaterally.     No airspace consolidations, pneumothoraces, or pleural effusions  bilaterally.     Mild peribronchial cuffing identified in the right hilar/infrahilar region.  The findings are nonspecific, however can be seen with small vessel  reactive airway disease or viral etiology.    The mediastinum is unremarkable, with no enlarged lymphadenopathy noted.  Arterial vascular calcifications. The visualized thyroid gland is  unremarkable in  appearance. Subcutaneous soft tissues are unremarkable in  appearance. Chronic compression deformities at multiple levels of the mid  and lower thoracic spine. No acute compression deformities.    The visualized portion of the abdomen demonstrates no acute abnormality.                                    ED Medication Orders (From admission, onward)    Ordered Start     Status Ordering Provider    01/16/22 2123 01/16/22 2124  fentaNYL (SUBLIMAZE) injection 25 mcg  ONCE         Last MAR action: Given KAUSHIK HERRERA JACQUI    01/16/22 1924 01/16/22 1925  fentaNYL (SUBLIMAZE) injection 50 mcg  ONCE         Last MAR action: Given KAUSHIK HERRERA J               MDM  D dimer elevated, age adjusted still within NL, but with hx of pleuritic pain and recent prolonged immobilization, CT ordered to r/o PE based on history.  Also clinical concern about occult fracture of rib or additional compression fracture of the spine (hx of prior compression fracture).   Prior hx reviewed, including prior hx of compression fracture    Pt treated with fentanyl which she has tolerated previously based on prior medical records    Patient states she used to be on a fentanyl patch at 1 time.  I would recommend she discuss this further with her pain management specialist and was advised that I would not be prescribing his from the emergency department.  She is frustrated by this and states it worked well for her but again I encouraged her to follow-up with her pain management specialist of a give her a short supply of Percocet to use on a as needed basis in the interim.    CT negative for PT and no acute fracture noted, but prior compression fractures noted.       Clinical Impression     ED Diagnosis   1. Back pain of thoracolumbar region  oxyCODONE-acetaminophen (Percocet) 5-325 MG per tablet   2. Chronic pain disorder  oxyCODONE-acetaminophen (Percocet) 5-325 MG per tablet   3. Chronic intractable pain  oxyCODONE-acetaminophen (Percocet) 5-325 MG per  tablet       Disposition        Discharge after Treatment 1/16/2022  9:24 PM  There is no comment  ED Course/MDM:    Diagnosis  The primary encounter diagnosis was Back pain of thoracolumbar region. Diagnoses of Chronic pain disorder and Chronic intractable pain were also pertinent to this visit.    Follow Up:  Manuel Bass MD  [address] [place] [id number]  [phone]          Your pain management specialist             Current Discharge Medication List      New Prescriptions    Details   oxyCODONE-acetaminophen (Percocet) 5-325 MG per tablet Take 1-2 tablets by mouth every 6 hours as needed for Pain (Not to exceed 4000 mg acetaminophen per day, MTE 6 tabs per 24 hours).  Qty: 10 tablet, Refills: 0             Disposition:  Discharge after Treatment 1/16/2022  9:24 PM  There is no comment                     Kateryna Arriaza MD  01/16/22 [id number]     n/a

## 2022-04-17 NOTE — ED ADULT TRIAGE NOTE - HEIGHT IN INCHES
If you are a smoker, it is important for your health to stop smoking. Please be aware that second hand smoke is also harmful.
3

## 2022-09-26 NOTE — ED ADULT NURSE NOTE - NSSEPSISSUSPECTED_ED_A_ED
[facility]  Follow up      Patient Name: [address] [place] [address],  [address] Record Number:  [id number]  Primary Care Physician:  Jude Anderson DO  Date of Consultation: 9/26/2022    Chief Complaint: Nasal and throat issues        Interval History    Patient is following up for a few different issues. She was having dysphonia and I did try round of Diflucan as her exam was somewhat suspicious for thrush and fungal laryngitis. She does think that this helped her voice. She said that she is always had a bit of a deep voice. She also had nasal congestion and loss of smell. She had a severe septal deviation and large turbinates, but got a CT scan of the sinuses to rule out sinusitis contributing to the loss of smell. REVIEW OF SYSTEMS  As above    PHYSICAL EXAM  GENERAL: No Acute Distress, Alert and Oriented, no Hoarseness, strong voice  EYES: EOMI, Anti-icteric  HENT:   Head: Normocephalic and atraumatic. Face:  Symmetric, facial nerve intact, no sinus tenderness  Right Ear: Normal external ear  Left Ear: Normal external ear  Mouth/Oral Cavity:  normal lips, Uvula is midline, no mucosal lesions, no trismus  Oropharynx/Larynx:  normal oropharynx  Nose:Normal external nasal appearance. Anterior rhinoscopy shows very rightward septal deviation with large turbinate  NECK: Normal range of motion, no thyromegaly, trachea is midline, no lymphadenopathy, no neck masses, no crepitus    I reviewed the patient's CT scan that she had done today. She has severe rightward septal deviation and large turbinates. She has trace sinusitis in the right maxillary sinus. Overall sinuses look good. ASSESSMENT/PLAN  1. Chronic pansinusitis  I do not think the patient has any significant chronic sinusitis. I think her loss of smell is probably a viral insult a decade ago. 2. Anosmia  As above  3.  Nasal congestion  The patient would certainly benefit from a septoplasty and Diabetes is worsening.  A1c above goal at 9.3% but was on steroids.  Continue current treatment regimen.  But increase ozempic.  Diabetes will be reassessed in 3 months.   turbinate reduction. She understands the risk including ongoing nasal congestion, nosebleed, septal perforation and need for revision surgery. She has agreed to proceed    4. Deviated septum  As above    5. Hypertrophy of inferior nasal turbinate  As above    6. Laryngitis  The Diflucan seem to help with her voice. She still has a bit of hoarseness. I would like for her to see our speech therapy for further evaluation. I have performed a head and neck physical exam personally or was physically present during the key or critical portions of the service. This note was generated completely or in part utilizing Dragon dictation speech recognition software. Occasionally, words are mistranscribed and despite editing, the text may contain inaccuracies due to incorrect word recognition. If further clarification is needed please contact the office at [phone]. No

## 2023-01-12 NOTE — ED PROVIDER NOTE - RESPIRATORY, MLM
You can access the FollowMyHealth Patient Portal offered by [facility] by registering at the following website: [email or web address]. By joining [facility] FollowMyHealth portal, you will also be able to view your health information using other applications (apps) compatible with our system. good air movement, speaking in full sentences, diffuse wheezing b/l

## 2023-08-18 NOTE — PACU DISCHARGE NOTE - HYDRATION STATUS:
Urine cultures show coag neg staph  Sensitivities don't mention Omnicef -- micro lab not sure it covers it  Repeat U/A neg (8/23)  S/P Omnicef (8/24)   Satisfactory

## 2023-09-15 NOTE — ASU PATIENT PROFILE, ADULT - AS SC BRADEN NUTRITION
(3) adequate
Patient received semisupine in bed in NAD; agreeable to participate in OT evaluation. Granddaughter at bedside.

## 2024-03-18 NOTE — ED ADULT NURSE NOTE - PRIMARY CARE PROVIDER
[facility] [place]    Patient:  Mayuri Almeida 67 y.o. female  YOB: 1956  MRN: [id number]  Primary Care Physician: Lincoln Johnson MD  REFERRING PROVIDER: Dionicio Araiza, *    Chief Complaint   ( 4 week follow up) Prosthetic shoulder infection (serratia marcescens )       History of Present Illness  Mayuri Almeida presents to [facility] for follow up of Serratia infection right shoulder s/p ertapenem followed by oral levofloxacin.  The original plan was not to have her on oral levofloxacin however her CRP trended up after ertapenem completion and out of abundance of caution she took that antibiotic.  She did have some difficulty with that with diarrhea but that all resolved when stopping the antibiotic.    She is doing well.  She is moving her shoulder without much difficulty.  She states she is not going to proceed with physical therapy as she and Dr. Araiza discussed her own therapy and it is working well.      Patient last saw Dionicio Araiza  on 2/12/2024  Next appointment is on 4/10/2024       Patient last saw Paulino Johnson MD on couple of weeks ago  Next appointment is on with in 6 months        Current Outpatient Medications on File Prior to Visit   Medication Sig    ALPRAZolam (XANAX) 0.25 MG tablet Take 1 tablet by mouth Every Night.    amLODIPine (NORVASC) 5 MG tablet Take 1 tablet by mouth Daily.    aspirin 81 MG EC tablet Take 1 tablet by mouth Daily.    atorvastatin (LIPITOR) 40 MG tablet Take 1 tablet by mouth Every Night.    B Complex Vitamins (VITAMIN-B COMPLEX PO) Take 1 tablet by mouth Daily.    Cholecalciferol 1000 units capsule Take 1 capsule by mouth Daily.    CRANBERRY EXTRACT PO Take 1 tablet by mouth Daily.    cyclobenzaprine (FLEXERIL) 10 MG tablet Take 1 tablet by mouth Every Night.    DULoxetine (CYMBALTA) 30 MG capsule Take 1 capsule by mouth 2 (Two) Times a Day.    estradiol (ESTRACE) 0.1 MG/GM vaginal cream  Insert 1 g into the vagina 3 (Three) Times a Week.    estrogens, conjugated, (PREMARIN) 0.625 MG tablet Take 1 tablet by mouth Every Night.    estrogens, conjugated, (PREMARIN) 0.625 MG tablet Take 1 tablet by mouth Daily.    lansoprazole (PREVACID) 15 MG capsule Take 1 capsule by mouth Daily.    levothyroxine (SYNTHROID, LEVOTHROID) 100 MCG tablet Take 1 tablet by mouth Every Morning.    losartan (COZAAR) 25 MG tablet Take 1 tablet by mouth Daily.    magnesium oxide (MAG-OX) 400 MG tablet Take 1 tablet by mouth 2 (Two) Times a Day.    metFORMIN (GLUCOPHAGE) 500 MG tablet Take 1 tablet by mouth 2 (Two) Times a Day With Meals.    montelukast (SINGULAIR) 10 MG tablet Take 1 tablet by mouth Every Night.    montelukast (SINGULAIR) 10 MG tablet Take 1 tablet by mouth Every Night.    oxyCODONE-acetaminophen (PERCOCET) 5-325 MG per tablet Take 1 tablet by mouth Every 8 (Eight) Hours As Needed.    Ozempic, 0.25 or 0.5 MG/DOSE, 2 MG/3ML solution pen-injector Inject 0.5 mg under the skin into the appropriate area as directed 1 (One) Time Per Week.    Potassium Chloride (KLOR-CON 10 PO) Take 10 mcg by mouth 3 (Three) Times a Day.    propranolol LA (INDERAL LA) 80 MG 24 hr capsule Take 1 capsule by mouth Daily.    propranolol LA (INDERAL LA) 80 MG 24 hr capsule Take 1 capsule by mouth Daily.    traMADol (ULTRAM) 50 MG tablet Take 1 tablet by mouth 3 times a day.    triamterene-hydrochlorothiazide (MAXZIDE) 75-50 MG per tablet Take 1 tablet by mouth Daily.    Vibegron 75 MG tablet Take 1 tablet by mouth Daily.     No current facility-administered medications on file prior to visit.     Allergies   Allergen Reactions    Aleve [Naproxen] Other (See Comments)     Pt has stage # 3 kidney disease.    Codeine Nausea And Vomiting      Social History     Socioeconomic History    Marital status:    Tobacco Use    Smoking status: Former     Current packs/day: 0.50     Average packs/day: 0.5 packs/day for 28.0 years (14.0 ttl  "pk-yrs)     Types: Cigarettes     Passive exposure: Never    Smokeless tobacco: Never   Vaping Use    Vaping status: Every Day    Substances: Nicotine, Flavoring    Devices: Disposable, Pre-filled or refillable cartridge   Substance and Sexual Activity    Alcohol use: No    Drug use: No    Sexual activity: Defer       Venous Access Review  Line/IV site: No current IV Access    Antimicrobial Review  Currently on antibiotics/antifungals: no  Start Date of Therapy: 11- - 12-: ertapenem   01- - 01-: levofloxacin 750 MG PO daily    If therapy completed, date complete: 01-       Objective   Vital Signs:  There were no vitals taken for this visit.  Estimated body mass index is 31.21 kg/m² as calculated from the following:    Height as of 12/22/23: 154.9 cm (61\").    Weight as of 12/22/23: 74.9 kg (165 lb 3.2 oz).           Physical Exam  General: Patient was evaluated with audiovisual assistance from my chart.  She was seated at home and in no acute distress  Respiratory: Effort even and unlabored  Somewhat difficult to visualize but she did appear to have a well-healed right anterior shoulder scar.  She reported no erythema and I did not visualize any erythema  Psych: Pleasant cooperative      DATA REVIEWED:  The following data was reviewed by: Bonnie Bee MD on 03/19/2024:           Reviewed note from Dr. Dionicio Araiza from February 12, 2024     Assessment and Plan   Diagnoses and all orders for this visit:    1. Prosthetic shoulder infection, subsequent encounter (Primary)  Comments:  Treated with intravenous ertapenem which stopped on December 30.  Patient had an increase in CRP and was started on levofloxacin January 5 through January 29.    2. Elevated C-reactive protein (CRP)  Comments:  Resolved    3. Serratia marcescens infection          There are no Patient Instructions on file for this visit.          Follow Up   No follow-ups on file.  Patient was given " instructions and counseling regarding her condition or for health maintenance advice. Please see specific information pulled into the AVS if appropriate.     Mode of Visit: Video  Location of patient: home  Location of provider: [facility]  You have chosen to receive care through a telehealth visit.  The patient has signed the video visit consent form.  The visit included audio and video interaction. No technical issues occurred during this visit.      none

## 2024-07-31 NOTE — PRE-ANESTHESIA EVALUATION ADULT - HEART RATE (BEATS/MIN)
87 Female Island Pedicle Flap Text: The defect edges were debeveled with a #15 scalpel blade.  Given the location of the defect, shape of the defect and the proximity to free margins an island pedicle advancement flap was deemed most appropriate.  Using a sterile surgical marker, an appropriate advancement flap was drawn incorporating the defect, outlining the appropriate donor tissue and placing the expected incisions within the relaxed skin tension lines where possible.    The area thus outlined was incised deep to adipose tissue with a #15 scalpel blade.  The skin margins were undermined to an appropriate distance in all directions around the primary defect and laterally outward around the island pedicle utilizing iris scissors.  There was minimal undermining beneath the pedicle flap.

## 2025-01-31 NOTE — ASU PREOP CHECKLIST - HEIGHT IN INCHES
Name: Kaylee Ellington      : 1967      MRN: [id number]  Encounter Provider: Larissa Chadwick MD  Encounter Date: 2025   Encounter department: [facility]  :  Assessment & Plan  Moderate episode of recurrent major depressive disorder (HCC)  Discussed her persistent and slightly worsening depression symptoms.  Discussed with her considering following up with psychology.  She is going to go through a work program that provides counseling services.  She is going to look into this.  Continue with the Lexapro.  Would consider potentially adding Wellbutrin to help with the depression symptoms but potentially this could worsen anxiety.  Discussed with her considering following up with psychiatry.         Anxiety  Anxiety symptoms have improved with the increased dose of the Lexapro.  Watch for any worsening.  Continue with the lorazepam on an as-needed basis but does not take this often.       Essential hypertension  Blood pressure continues to be elevated.  Will change her from the valsartan alone to the valsartan/hydrochlorothiazide as noted.  Watch salt intake.  Discussed with her getting a TSH checked to make sure that her hyperthyroidism has not worsened since this may be affecting the other medical issues.  Orders:  •  valsartan-hydrochlorothiazide (DIOVAN-HCT) 320-12.5 MG per tablet; Take 1 tablet by mouth daily    Primary insomnia  Worsening sleep issues discussed.  Discussed options with her.  Will start her on the trazodone as noted.  Can gradually increase the dosage if needed.  Orders:  •  traZODone (DESYREL) 50 mg tablet; Take 1 tablet (50 mg total) by mouth daily at bedtime    Hyperthyroidism    Orders:  •  TSH, 3rd generation with Free T4 reflex; Future           History of Present Illness   She presents for follow-up.  She feels that her anxiety symptoms have improved somewhat.  Does not feel so much on edge.  She, however, does feel that the depression is worsened.  Has 
"more difficulty completing her normal activities.  Feels like she has to rest more frequently and feels significant difficulty with completing tasks.  Sleep has worsened.  Has difficulty both falling asleep and staying asleep.  States that she feels exhausted all the time.  Has been having intermittent headaches she was concerned if this was related to her blood pressure.  Still has difficulty concentrating.  Has difficulty with doing some of the normal activities around the house.  She usually can complete the but takes almost double the time.  Denies any chest pain or palpitations.  Has been having some headaches.  Has been taking the valsartan regularly.  Is concerned about being able to concentrate if she returns to work and is concerned that she will be unable to complete tasks and responsibilities adequately because of the concentration issue and lack of focus.      Review of Systems   Constitutional:  Positive for activity change, appetite change and fatigue.   Respiratory:  Negative for shortness of breath.    Cardiovascular:  Negative for chest pain and palpitations.   Neurological:  Positive for headaches. Negative for dizziness, speech difficulty and weakness.   Psychiatric/Behavioral:  Positive for decreased concentration, dysphoric mood and sleep disturbance. The patient is nervous/anxious.        Objective   /94 (BP Location: Left arm, Patient Position: Sitting, Cuff Size: Large)   Pulse 103   Temp (!) 97.2 °F (36.2 °C)   Ht 5' 5\" (1.651 m)   Wt 80.8 kg (178 lb 3.2 oz)   SpO2 94%   BMI 29.65 kg/m²      Physical Exam  Vitals and nursing note reviewed.   Constitutional:       Appearance: She is well-developed and well-groomed.   Cardiovascular:      Rate and Rhythm: Normal rate and regular rhythm.      Heart sounds: No murmur heard.  Pulmonary:      Breath sounds: No decreased breath sounds, wheezing or rhonchi.   Neurological:      Mental Status: She is alert and oriented to person, place, "
and time.   Psychiatric:         Mood and Affect: Mood is depressed. Affect is tearful.         Speech: Speech normal.         Behavior: Behavior is cooperative.         Cognition and Memory: Cognition and memory normal.         
3
No